# Patient Record
Sex: FEMALE | Race: WHITE | Employment: PART TIME | URBAN - METROPOLITAN AREA
[De-identification: names, ages, dates, MRNs, and addresses within clinical notes are randomized per-mention and may not be internally consistent; named-entity substitution may affect disease eponyms.]

---

## 2019-12-17 ENCOUNTER — HOSPITAL ENCOUNTER (OUTPATIENT)
Dept: PHYSICAL THERAPY | Age: 74
Discharge: HOME | End: 2019-12-17
Payer: MEDICARE

## 2019-12-17 ENCOUNTER — HOSPITAL ENCOUNTER (OUTPATIENT)
Dept: SURGERY | Age: 74
Discharge: HOME | End: 2019-12-17
Payer: MEDICARE

## 2019-12-17 ENCOUNTER — HOME HEALTH ADMISSION (OUTPATIENT)
Dept: HOME HEALTH SERVICES | Facility: HOME HEALTH | Age: 74
End: 2019-12-17
Payer: MEDICARE

## 2019-12-17 VITALS
SYSTOLIC BLOOD PRESSURE: 144 MMHG | BODY MASS INDEX: 24.55 KG/M2 | OXYGEN SATURATION: 98 % | HEART RATE: 70 BPM | HEIGHT: 61 IN | WEIGHT: 130 LBS | RESPIRATION RATE: 18 BRPM | TEMPERATURE: 97 F | DIASTOLIC BLOOD PRESSURE: 75 MMHG

## 2019-12-17 LAB
ANION GAP SERPL CALC-SCNC: 4 MMOL/L (ref 7–16)
ATRIAL RATE: 70 BPM
BACTERIA SPEC CULT: NORMAL
BUN SERPL-MCNC: 17 MG/DL (ref 8–23)
CALCIUM SERPL-MCNC: 9.3 MG/DL (ref 8.3–10.4)
CALCULATED P AXIS, ECG09: 93 DEGREES
CALCULATED R AXIS, ECG10: 16 DEGREES
CALCULATED T AXIS, ECG11: 5 DEGREES
CHLORIDE SERPL-SCNC: 107 MMOL/L (ref 98–107)
CO2 SERPL-SCNC: 29 MMOL/L (ref 21–32)
CREAT SERPL-MCNC: 0.95 MG/DL (ref 0.6–1)
DIAGNOSIS, 93000: NORMAL
ERYTHROCYTE [DISTWIDTH] IN BLOOD BY AUTOMATED COUNT: 13.2 % (ref 11.9–14.6)
GLUCOSE SERPL-MCNC: 137 MG/DL (ref 65–100)
HCT VFR BLD AUTO: 36.5 % (ref 35.8–46.3)
HGB BLD-MCNC: 10.8 G/DL (ref 11.7–15.4)
MCH RBC QN AUTO: 28.3 PG (ref 26.1–32.9)
MCHC RBC AUTO-ENTMCNC: 29.6 G/DL (ref 31.4–35)
MCV RBC AUTO: 95.5 FL (ref 79.6–97.8)
NRBC # BLD: 0 K/UL (ref 0–0.2)
P-R INTERVAL, ECG05: 196 MS
PLATELET # BLD AUTO: 252 K/UL (ref 150–450)
PMV BLD AUTO: 9 FL (ref 9.4–12.3)
POTASSIUM SERPL-SCNC: 3.8 MMOL/L (ref 3.5–5.1)
Q-T INTERVAL, ECG07: 384 MS
QRS DURATION, ECG06: 80 MS
QTC CALCULATION (BEZET), ECG08: 414 MS
RBC # BLD AUTO: 3.82 M/UL (ref 4.05–5.2)
SERVICE CMNT-IMP: NORMAL
SODIUM SERPL-SCNC: 140 MMOL/L (ref 136–145)
VENTRICULAR RATE, ECG03: 70 BPM
WBC # BLD AUTO: 8.3 K/UL (ref 4.3–11.1)

## 2019-12-17 PROCEDURE — 97161 PT EVAL LOW COMPLEX 20 MIN: CPT

## 2019-12-17 PROCEDURE — 36415 COLL VENOUS BLD VENIPUNCTURE: CPT

## 2019-12-17 PROCEDURE — 93005 ELECTROCARDIOGRAM TRACING: CPT | Performed by: ANESTHESIOLOGY

## 2019-12-17 PROCEDURE — 85027 COMPLETE CBC AUTOMATED: CPT

## 2019-12-17 PROCEDURE — 80048 BASIC METABOLIC PNL TOTAL CA: CPT

## 2019-12-17 PROCEDURE — 87641 MR-STAPH DNA AMP PROBE: CPT

## 2019-12-17 PROCEDURE — 77030027138 HC INCENT SPIROMETER -A

## 2019-12-17 RX ORDER — SIMVASTATIN 20 MG/1
20 TABLET, FILM COATED ORAL
COMMUNITY

## 2019-12-17 RX ORDER — IBUPROFEN 200 MG
TABLET ORAL
COMMUNITY
End: 2020-01-10

## 2019-12-17 RX ORDER — GLIPIZIDE 2.5 MG/1
2.5 TABLET, EXTENDED RELEASE ORAL
COMMUNITY

## 2019-12-17 RX ORDER — GABAPENTIN 300 MG/1
300 CAPSULE ORAL 2 TIMES DAILY
COMMUNITY

## 2019-12-17 RX ORDER — ZINC GLUCONATE 10 MG
LOZENGE ORAL
COMMUNITY

## 2019-12-17 RX ORDER — LORAZEPAM 1 MG/1
1 TABLET ORAL
COMMUNITY

## 2019-12-17 RX ORDER — ASPIRIN 81 MG/1
81 TABLET ORAL
COMMUNITY
End: 2020-01-10

## 2019-12-17 RX ORDER — HYDROGEN PEROXIDE 3 %
20 SOLUTION, NON-ORAL MISCELLANEOUS
COMMUNITY

## 2019-12-17 RX ORDER — CETIRIZINE HCL 10 MG
10 TABLET ORAL DAILY
COMMUNITY

## 2019-12-17 RX ORDER — MELOXICAM 7.5 MG/1
7.5 TABLET ORAL DAILY
COMMUNITY
End: 2020-01-10

## 2019-12-17 NOTE — PROGRESS NOTES
SW met with pt in Prehab to discuss Right TKA scheduled for 1/9/20. Pt reports she had a Left TKA about 6 years ago. Pt plans to return home with spouse and  HHPT. Pt resides in [facility] and chose [facility]. [facility] referral completed. Pt reports she has a RW but will need a BSC for home use. SW will meet with pt post op to verify DME needs. No additional need or questions identified at this time. Linh León    The Plan for Transition of Care is related to the following treatment goals: [place] at home. The Patien was provided with a choice of provider and agrees   with the discharge plan. [x] Yes [] No    Freedom of choice list was provided with basic dialogue that supports the patient's individualized plan of care/goals, treatment preferences and shares the quality data associated with the providers.  [x] Yes [] No

## 2019-12-17 NOTE — PERIOP NOTES
Recent Results (from the past 12 hour(s))   CBC W/O DIFF    Collection Time: 12/17/19  7:40 AM   Result Value Ref Range    WBC 8.3 4.3 - 11.1 K/uL    RBC 3.82 (L) 4.05 - 5.2 M/uL    HGB 10.8 (L) 11.7 - 15.4 g/dL    HCT 36.5 35.8 - 46.3 %    MCV 95.5 79.6 - 97.8 FL    MCH 28.3 26.1 - 32.9 PG    MCHC 29.6 (L) 31.4 - 35.0 g/dL    RDW 13.2 11.9 - 14.6 %    PLATELET 037 947 - 713 K/uL    MPV 9.0 (L) 9.4 - 12.3 FL    ABSOLUTE NRBC 0.00 0.0 - 0.2 K/uL   METABOLIC PANEL, BASIC    Collection Time: 12/17/19  7:40 AM   Result Value Ref Range    Sodium 140 136 - 145 mmol/L    Potassium 3.8 3.5 - 5.1 mmol/L    Chloride 107 98 - 107 mmol/L    CO2 29 21 - 32 mmol/L    Anion gap 4 (L) 7 - 16 mmol/L    Glucose 137 (H) 65 - 100 mg/dL    BUN 17 8 - 23 MG/DL    Creatinine 0.95 0.6 - 1.0 MG/DL    GFR est AA >60 >60 ml/min/1.73m2    GFR est non-AA >60 >60 ml/min/1.73m2    Calcium 9.3 8.3 - 10.4 MG/DL   EKG, 12 LEAD, INITIAL    Collection Time: 12/17/19  8:55 AM   Result Value Ref Range    Ventricular Rate 70 BPM    Atrial Rate 70 BPM    P-R Interval 196 ms    QRS Duration 80 ms    Q-T Interval 384 ms    QTC Calculation (Bezet) 414 ms    Calculated P Axis 93 degrees    Calculated R Axis 16 degrees    Calculated T Axis 5 degrees    Diagnosis       Atrial-paced rhythm  Low voltage QRS  Nonspecific ST abnormality  Abnormal ECG  No previous ECGs available

## 2019-12-17 NOTE — PERIOP NOTES
Patient verified name and . Order for consent NOT found in EHR, unable to confirm case posting; patient verified. Type 3 surgery, PAT joint assessment complete. Labs per surgeon: No orders received--lab work completed per anesthesia protocol. Labs per anesthesia protocol: cbc, bmp; results within anesthesia limits. T&S DOS and POC glucose DOS; orders signed and held in EHR. EKG: completed 19; results to be reviewed once previous EKG received for comparison. Charge nurse to f/u. Signed medical release faxed to Dr. Fidelia Wade office (Fax: [phone]) requested last EKG, Stress, Echo, and Cath report to be faxed to [phone]. Confirmation page received and placed on chart. Charge nurse will f/u. Copy of Pacemaker card, last cardiology office note (19), and Pacemaker Interrogation report (19) placed on chart if needed for anesthesia reference. Patient has an appointment with cardiologist on 19. Charge nurse will obtain/review note after appointment and place on chart for anesthesia reference. May Ashley ([phone]), [facility], made aware that patient is scheduled for a right TKA on 20 at [address] given pre-op's phone number to call for surgery start time. May Ashley verbalized understanding and states \"I'll put it on the calendar. \"     [facility] Surgery Scheduler, made aware that patient has a pacemaker. Bonita verbalized understanding. MRSA/MSSA swab collected; pharmacy to review and dose antibiotic as appropriate. Hospital approved surgical skin cleanser and instructions to return bottle on DOS given per hospital policy. Patient provided with handouts including Guide to Surgery, Pain Management, Hand Hygiene, Blood Transfusion Education, and [place] [facility] Brochure. Patient answered medical/surgical history questions at their best of ability. All prior to admission medications documented in [facility].  Original medication prescription bottle NOT visualized during patient appointment. Patient instructed to hold all vitamin, supplements, herbals 3 weeks prior to surgery and NSAIDS 5 days prior to surgery. Patient teach back successful and patient demonstrates knowledge of instruction.

## 2019-12-17 NOTE — PERIOP NOTES
Labs dated 12/17/19 routed via [facility] to patients PCP, Dr. Deandra Ferris, and to surgeon, Dr. Yvonne Roberts.

## 2019-12-17 NOTE — PROGRESS NOTES
12/17/19 0730   Oxygen Therapy   O2 Sat (%) 98 %   Pulse via Oximetry 73 beats per minute   O2 Device Room air   Pre-Treatment   Breath Sounds Bilateral Clear;Diminished   Pre FEV1 (liters) 1.5 liters   % Predicted 73   Incentive Spirometry Treatment   Actual Volume (ml) 1200 ml     Initial respiratory Assessment completed with pt. Pt was interviewed and evaluated in Joint camp prior to surgery. Patient ID:  Gilbert Wooten  [id number]  69 y.o.  1945  Surgeon: Dr. Mary Peoples  Date of Surgery: 1/9/2020  Procedure: Total Right Knee Arthroplasty  Primary Care Physician: Manisha Montoya [phone]  Specialists:                                  Pt instructed in the use of Incentive Spirometry. Pt instructed to bring Incentive Spirometer back on date of surgery & to start using Is upon return to pt room. Pt taught proper cough technique    History of smoking:   DENIES                                                      Quit date:           Secondhand smoke:SPOUSE      Past procedures with Oxygen desaturation:DELAYED AWAKENING AFTER KNEE SURGERY 6 YEARS AGO    Past Medical History:   Diagnosis Date    Adopted     Allergic rhinitis     Dyslipidemia     Essential hypertension     GERD (gastroesophageal reflux disease)     PRN medication     Nausea & vomiting     PAF (paroxysmal atrial fibrillation) ([address]     per cardiology note (6/14/19) \"There has been a history in the past however several EKGs and Holter monitors were reviewed with no recurrence. She has remained on aspirin. \"     SSS (sick sinus syndrome) ([address]     s/p PPM--Followed by Dr. Palak Connolly Type 2 diabetes mellitus ([address]     controlled with oral medication, average FBS . No problems with hypoglycemia.      VHD (valvular heart disease)     Mild to moderate TR/mild MR echo 2014                                                                                                                                                     Respiratory history:DENIES SOB                                                                   Respiratory meds:  DENIES                                       FAMILY PRESENT:            SPOUSE,                                                                                          PAST SLEEP STUDY:                      DENIES  HX OF SONY:                                      DENIES                                     SONY assessment:                                               SLEEPS ON SIDE       &      BACK                                                         PHYSICAL EXAM   Body mass index is 24.56 kg/m².    Visit Vitals  /75 (BP 1 Location: Left arm, BP Patient Position: At rest;Sitting)   Pulse 70   Temp 97 °F (36.1 °C)   Resp 18   Ht 5' 1\" (1.549 m)   Wt 59 kg (130 lb)   SpO2 98%   BMI 24.56 kg/m²     Neck circumference: 33     cm    Loud snoring:                                     DENIES    Witnessed apnea or wakening gasping or choking:,             DENIES,                                                                                                   Awakens with headaches:                                                  YES    Morning or daytime tiredness/ sleepiness:                                                                                                           TIRED   Dry mouth or sore throat in morning:                YES                                                                           Velez stage:  3    SACS score:2      Stop Bang   STOP-BANG  Does the patient snore loudly (louder than talking or loud enough to be heard through closed doors)?: No  Does the patient often feel tired, fatigued, or sleepy during the daytime, even after a \"good\" night's sleep?: Yes  Has anyone ever observed the patient stop breathing during their sleep? : No  Does the patient have or are they being treated for high blood pressure?: No  Is the patient's BMI greater than 35?: No  Is your neck circumference greater than 17 inches (Male) or 16 inches (Female)?: No  Is the patient older than 48?: Yes  Is the patient male?: No  SONY Score: 2                            CPAP:                       NONE                                                 CONT SAT HS            Referrals:    Pt. Phone Number:

## 2019-12-17 NOTE — PROGRESS NOTES
Octavio Primer  : (73 y.o.) [address] at [place] [address].  [phone]       Physical Therapy Prehab Plan of Treatment and Evaluation Summary:2019    ICD-10: Treatment Diagnosis:   · Pain in Right Knee (M25.561)  · Stiffness of Right Knee, Not elsewhere classified (M25.661)  · Difficulty in walking, Not elsewhere classified (R26.2)  Precautions/Allergies:   Clonidine; Codeine; Diclofenac; Hydrocodone; Lyrica [pregabalin]; Oxycodone; and Tramadol  MEDICAL/REFERRING DIAGNOSIS:  Unilateral primary osteoarthritis, right knee [M17.11]  REFERRING PHYSICIAN: Darek Paz,*  DATE OF SURGERY: 20    Assessment:   Comments:  Pt. Plans to go home with spouse who is here today. She reports a left tka 6 years ago at another hospital.  She had a hard time after that surgery due to a femur fracture during surgery. Her left knee is doing fine now but she is worried about this surgery after that experience. PROBLEM LIST (Impacting functional limitations):  Ms. Grecia Arredondo presents with the following right lower extremity(s) problems:  1. Strength  2. Range of Motion  3. Home Exercise Program  4. Pain   INTERVENTIONS PLANNED:  1. Home Exercise Program  2. Educational Discussion      TREATMENT PLAN: Effective Dates: 2019 TO 2019. Frequency/Duration: Patient to continue to perform home exercise program at least twice per day up until her surgery. GOALS: (Goals have been discussed and agreed upon with patient.)  Discharge Goals: Time Frame: 1 Day  1. Patient will demonstrate independence with a home exercise program designed to increase strength, range of motion and pain control to minimize functional deficits and optimize patient for total joint replacement.   Rehabilitation Potential For Stated Goals: Good  Regarding Sachin Kidd therapy, I certify that the treatment plan above will be carried out by a therapist or under their direction. Thank you for this referral,  Deepika Laguna, PT               HISTORY:   Present Symptoms:  Pain Intensity 1: (10 at worst)  Pain Location 1: Knee   History of Present Injury/Illness (Reason for Referral):  Medical/Referring Diagnosis: Unilateral primary osteoarthritis, right knee [M17.11]   Past Medical History/Comorbidities:   Ms. Kera Akers  has a past medical history of Adopted, Allergic rhinitis, Dyslipidemia, Essential hypertension, GERD (gastroesophageal reflux disease), Nausea & vomiting, PAF (paroxysmal atrial fibrillation) ([facility] SSS (sick sinus syndrome) ([facility] Type 2 diabetes mellitus ([facility] and VHD (valvular heart disease).   Ms. Kera Akers  has a past surgical history that includes hx knee replacement (Left, 2014); hx tubal ligation; hx pacemaker placement (10/26/2018); hx dilation and curettage; hx colonoscopy; hx heart catheterization; and hx other surgical.  Social History/Living Environment:   Home Environment: Private residence  # Steps to Enter: 2  Rails to Enter: No  One/Two Story Residence: One story  Living Alone: No  Support Systems: Spouse/Significant Other/Partner  Patient Expects to be Discharged to[de-identified] Private residence  Current DME Used/Available at Home: Ferdinand Welsh, straight, Walker, rolling, Commode, bedside, Shower chair  Tub or Shower Type: Shower  Work/Activity:  Part time , mostly sitting  Dominant Side:  RIGHT  Current Medications:  See Pre-assessment nursing note   Number of Personal Factors/Comorbidities that affect the Plan of Care: 1-2: MODERATE COMPLEXITY   EXAMINATION:   ADLs (Current Functional Status):   Ambulation:  [x] Independent  [] Walk Indoors Only  [] Walk Outdoors  [] Use Assistive Device  [] Use Wheelchair Only Dressing:  [x] Independent  Requires Assistance from Someone for:  [] Sock/Shoes  [] Pants  [] Everything   Bathing/Showering:   [x] Independent  [] Requires Assistance from Someone  [] [address] Only Household Activities:  [x] Routine house and yard work  [x] Light Housework Only  [] None   Observation/Orthostatic Postural Assessment:   Exceptions to WDLRounded shoulders  ROM/Flexibility:   Gross Assessment: Yes  AROM: Within functional limits(left LE)                       RLE Assessment  RLE Assessment (WDL): Exceptions to WDL  RLE AROM  R Knee Flexion: 117  R Knee Extension: 5   Strength:   Gross Assessment: Yes  Strength: Generally decreased, functional(left LE)              RLE Strength  R Knee Flexion: 4  R Knee Extension: 4   Functional Mobility:    Gross Assessment: Yes    Gait Description (WDL): Exceptions to WDL  Stand to Sit: Additional time, Independent  Sit to Stand: Independent, Additional time  Distance (ft): 500 Feet (ft)  Ambulation - Level of Assistance: Independent  Speed/Maria [place] Carmen: Delayed  Step Length: Left shortened;Right shortened  Stance: Left decreased  Gait Abnormalities: Antalgic          Balance:    Sitting: Intact  Standing: Intact   Body Structures Involved:  1. Bones  2. Joints  3. Muscles  4. Ligaments Body Functions Affected:  1. Movement Related Activities and Participation Affected:  1. Mobility   Number of elements that affect the Plan of Care: 3: MODERATE COMPLEXITY   CLINICAL PRESENTATION:   Presentation: Stable and uncomplicated: LOW COMPLEXITY   CLINICAL DECISION MAKING:   Outcome Measure: Tool Used: Lower Extremity Functional Scale (LEFS)  Score:  Initial: 38/80 Most Recent: X/80 (Date: -- )   Interpretation of Score: 20 questions each scored on a 5 point scale with 0 representing \"extreme difficulty or unable to perform\" and 4 representing \"no difficulty\". The lower the score, the greater the functional disability. 80/80 represents no disability. Minimal detectable change is 9 points. Medical Necessity:   · Ms. Grecia Arredondo is expected to optimize her lower extremity strength and ROM in preparation for joint replacement surgery.   Reason for Services/Other Comments:  · Achieve baseline assesment of musculoskeletal system, functional mobility and home environment. , educate in PT HEP in preparation for surgery, educate in hospital plan of care. Use of outcome tool(s) and clinical judgement create a POC that gives a: Clear prediction of patient's progress: LOW COMPLEXITY   TREATMENT:   Treatment/Session Assessment:  Patient was instructed in PT- HEP to increase strength and ROM in LEs. Answered all questions. · Post session pain:  Knee pain  · Compliance with Program/Exercises: compliant most of the time.   Total Treatment Duration:  PT Patient Time In/Time Out  Time In: 0745  Time Out: 0800    Annie Wei PT

## 2019-12-17 NOTE — PERIOP NOTES
Pt called to report she forgot to inform RN earlier today she uses oxicell cream on her legs for pain relief. Added oxicell cream to medication list in EMR, also instructed to pt stop using day prior to surgery.

## 2019-12-17 NOTE — PERIOP NOTES
PLEASE CONTINUE TAKING ALL PRESCRIPTION MEDICATIONS UP TO THE DAY OF SURGERY UNLESS OTHERWISE DIRECTED BELOW. DISCONTINUE all vitamins, herbals and supplements 21 days prior to surgery. DISCONTINUE Non-Steriodal Anti-Inflammatory (NSAIDS) such as Advil, Ibuprofen, and Aleve 5 days prior to surgery. Home Medications to HOLD      All vitamins, supplements, and herbals stop 21 days prior to surgery--including Berberine and CBD oil. All NSAIDs such as MELOXICAM, Advil, Aleve, Ibuprofen, Diclofenac, Naproxen, etc. Stop 5 days prior to surgery. Home Medications to take  the day of surgery   Gabapentin, Nexium        Comments   Please bring bottle of soap (Dynahex), incentive spirometer, Cetirizine, and Nexium to the hospital.       *Please continue nightly 81 mg Aspirin*       Please do not bring home medications with you on the day of surgery unless otherwise directed by your nurse. If you are instructed to bring home medications, please give them to your nurse as they will be administered by the nursing staff. If you have any questions, please call [address] [place] [address] [phone] or [address] [place] [address] [phone]. Copy of above instructions given to patient.

## 2019-12-20 NOTE — H&P
[facility]  History and Physical Exam    Patient ID:  Rambo Franco  [id number]    69 y.o.  1945    Today: December 20, 2019    Vitals Signs: Reviewed as noted in medical record. Allergies: Allergies   Allergen Reactions    Clonidine Itching    Codeine Itching     And all derivitives      Diclofenac Other (comments) and Nausea and Vomiting    Hydrocodone Itching    Lyrica [Pregabalin] Other (comments)     \"makes me feel funny\"     Oxycodone Itching    Tramadol Itching       CC: Right knee pain    HPI:  Pt complains of right knee pain with difficulty ambulating. Relevant PMH:   Past Medical History:   Diagnosis Date    Adopted     Allergic rhinitis     Dyslipidemia     Essential hypertension     GERD (gastroesophageal reflux disease)     PRN medication     Nausea & vomiting     PAF (paroxysmal atrial fibrillation) ([address]     per cardiology note (6/14/19) \"There has been a history in the past however several EKGs and Holter monitors were reviewed with no recurrence. She has remained on aspirin. \"     SSS (sick sinus syndrome) ([facility]     s/p PPM--Followed by Dr. Rula Quispe Type 2 diabetes mellitus ([facility]     controlled with oral medication, average FBS . No problems with hypoglycemia.  VHD (valvular heart disease)     Mild to moderate TR/mild MR echo 2014       Objective:                    HEENT: NC/AT                   Lungs:  clear                   Heart:   rrr                   Abdomen: soft                   Extremities:  Pain with rom of the right knee joint    Radiographs: reveal osteoarthritis with loss of joint space and bone spurs. Assessment: Unilateral primary osteoarthritis, right knee [M17.11]    Plan:  Proceed with scheduled Procedure(s) (LRB):  RIGHT KNEE ARTHROPLASTY TOTAL / GREGG PT HAS A PACEMAKER (Right) . The patient has failed conservative treatment including NSAIDS, and injections.   Due to the amount of pain the patient is experiencing we will proceed with scheduled procedure. It is also felt that the patient is high risk for postoperative complications due to her advanced age and history of multiple chronic medical problems.   Patient may potentially spend 2 nights in the hospital.       Signed By: IGNACIO Portillo  December 20, 2019

## 2019-12-31 NOTE — ADVANCED PRACTICE NURSE
Total Joint Surgery Preoperative Chart Review      Patient ID:  Alexis Culp  [id number]  69 y.o.  1945  Surgeon: Dr. Navin Castellanos  Date of Surgery: 1/9/2020  Procedure: Total Right Knee Arthroplasty  Primary Care Physician: Stacy Ibarra [phone]  Specialty Physician(s):      Subjective:   Alexis Cupl is a 69 y.o. WHITE OR  female who presents for preoperative evaluation for Total Right Knee arthroplasty. This is a preoperative chart review note based on data collected by the nurse at the surgical Pre-Assessment visit. Past Medical History:   Diagnosis Date    Adopted     Allergic rhinitis     Dyslipidemia     Essential hypertension     GERD (gastroesophageal reflux disease)     PRN medication     Nausea & vomiting     PAF (paroxysmal atrial fibrillation) ([facility]     per cardiology note (6/14/19) \"There has been a history in the past however several EKGs and Holter monitors were reviewed with no recurrence. She has remained on aspirin. \"     SSS (sick sinus syndrome) ([facility]     s/p PPM--Followed by Dr. Augusto Baptiste Type 2 diabetes mellitus ([facility]     controlled with oral medication, average FBS . No problems with hypoglycemia.  VHD (valvular heart disease)     Mild to moderate TR/mild MR echo 2014      Past Surgical History:   Procedure Laterality Date    HX COLONOSCOPY      HX DILATION AND CURETTAGE      HX HEART CATHETERIZATION      x2--no intervention     HX KNEE REPLACEMENT Left 2014    HX OTHER SURGICAL      esophageal stretching     HX PACEMAKER PLACEMENT  10/26/2018    HX TUBAL LIGATION       No family history on file. Social History     Tobacco Use    Smoking status: Never Smoker    Smokeless tobacco: Never Used   Substance Use Topics    Alcohol use: Never     Frequency: Never       Prior to Admission medications    Medication Sig Start Date End Date Taking? Authorizing Provider   simvastatin (ZOCOR) 20 mg tablet Take  by mouth nightly.    Yes Provider, Historical   gabapentin (NEURONTIN) 300 mg capsule Take 300 mg by mouth two (2) times a day. Yes Provider, Historical   meloxicam (MOBIC) 7.5 mg tablet Take 7.5 mg by mouth daily. Yes Provider, Historical   glipiZIDE SR (GLUCOTROL XL) 2.5 mg CR tablet Take  by mouth nightly. Yes Provider, Historical   LORazepam (ATIVAN) 1 mg tablet Take  by mouth nightly. Yes Provider, Historical   aspirin delayed-release 81 mg tablet Take 81 mg by mouth nightly. Yes Provider, Historical   cetirizine (ZYRTEC) 10 mg tablet Take 10 mg by mouth daily. Yes Provider, Historical   esomeprazole (NEXIUM) 20 mg capsule Take 20 mg by mouth daily as needed for (GERD) Gastroesophageal Reflux Disease. Yes Provider, Historical   Berb Hi/herbal complex no.18 (BERBERINE-HERBAL COMB NO.18 PO) Take 1,000 mg by mouth two (2) times a day. Yes Provider, Historical   ibuprofen (MOTRIN) 200 mg tablet Take  by mouth every six (6) hours as needed for Pain. Yes Provider, Historical   CANNABIDIOL, CBD, EXTRACT PO Take  by mouth. Yes Provider, Historical   cholecalciferol, vitamin D3, (VITAMIN D3 PO) Take  by mouth. Yes Provider, Historical   magnesium 250 mg tab Take  by mouth. Yes Provider, Historical   CALCIUM PO Take  by mouth. Yes Provider, Historical   OTHER Apply  to affected area. Oxicell cream for pain   Yes Provider, Historical     Allergies   Allergen Reactions    Clonidine Itching    Codeine Itching     And all derivitives      Diclofenac Other (comments) and Nausea and Vomiting    Hydrocodone Itching    Lyrica [Pregabalin] Other (comments)     \"makes me feel funny\"     Oxycodone Itching    Tramadol Itching          Objective:     Physical Exam:   No data found.     ECG:    EKG Results     Procedure 720 Value Units Date/Time    EKG, 12 LEAD, INITIAL [[id number]] Collected:  12/17/19 0855    Order Status:  Completed Updated:  12/17/19 1143     Ventricular Rate 70 BPM      Atrial Rate 70 BPM      P-R Interval 196 ms QRS Duration 80 ms      Q-T Interval 384 ms      QTC Calculation (Bezet) 414 ms      Calculated P Axis 93 degrees      Calculated R Axis 16 degrees      Calculated T Axis 5 degrees      Diagnosis --     Atrial-paced rhythm  Low voltage QRS  Nonspecific ST abnormality  Abnormal ECG  No previous ECGs available  Confirmed by ST JO ANN FAYE MD (), GIO BIRCH ([id number]) on 12/17/2019 11:43:12 AM            Data Review:   Labs:     Results for Calista Iqbal (MRN [id number]) as of 12/31/2019 12:31   Ref. Range 12/17/2019 07:40   WBC Latest Ref Range: 4.3 - 11.1 K/uL 8.3   RBC Latest Ref Range: 4.05 - 5.2 M/uL 3.82 (L)   HGB Latest Ref Range: 11.7 - 15.4 g/dL 10.8 (L)   HCT Latest Ref Range: 35.8 - 46.3 % 36.5   MCV Latest Ref Range: 79.6 - 97.8 FL 95.5   MCH Latest Ref Range: 26.1 - 32.9 PG 28.3   MCHC Latest Ref Range: 31.4 - 35.0 g/dL 29.6 (L)   RDW Latest Ref Range: 11.9 - 14.6 % 13.2   PLATELET Latest Ref Range: 150 - 450 K/uL 252   MPV Latest Ref Range: 9.4 - 12.3 FL 9.0 (L)   ABSOLUTE NRBC Latest Ref Range: 0.0 - 0.2 K/uL 0.00       Total Joint Surgery Pre-Assessment Recommendations:           Recommend continuous saturation monitoring hours of sleep, during hospitalization.       Signed By: LINA Harvey    December 31, 2019

## 2020-01-02 NOTE — PERIOP NOTES
3rd and final request faxed to [facility] requesting most recent EKG and any other test reports be faxed for anesthesiologist to review pre op. Received faxed EKG dated 10/24/18. No other reports available per note on fax cover sheet. Also faxed request to [facility] in [place], pt not on file at their office. Chart posted for anesthesia to review EKGs/chart.

## 2020-01-07 ENCOUNTER — ANESTHESIA EVENT (OUTPATIENT)
Dept: SURGERY | Age: 75
DRG: 470 | End: 2020-01-07
Payer: MEDICARE

## 2020-01-09 ENCOUNTER — HOSPITAL ENCOUNTER (INPATIENT)
Age: 75
LOS: 1 days | Discharge: HOME HEALTH | DRG: 470 | End: 2020-01-10
Attending: ORTHOPAEDIC SURGERY | Admitting: ORTHOPAEDIC SURGERY
Payer: MEDICARE

## 2020-01-09 ENCOUNTER — ANESTHESIA (OUTPATIENT)
Dept: SURGERY | Age: 75
DRG: 470 | End: 2020-01-09
Payer: MEDICARE

## 2020-01-09 DIAGNOSIS — Z96.651 STATUS POST TOTAL KNEE REPLACEMENT, RIGHT: Primary | ICD-10-CM

## 2020-01-09 PROBLEM — M17.11 ARTHRITIS OF RIGHT KNEE: Status: ACTIVE | Noted: 2020-01-09

## 2020-01-09 LAB
GLUCOSE BLD STRIP.AUTO-MCNC: 129 MG/DL (ref 65–100)
HGB BLD-MCNC: 8.9 G/DL (ref 11.7–15.4)

## 2020-01-09 PROCEDURE — 74011250637 HC RX REV CODE- 250/637: Performed by: PHYSICIAN ASSISTANT

## 2020-01-09 PROCEDURE — 74011000250 HC RX REV CODE- 250: Performed by: ORTHOPAEDIC SURGERY

## 2020-01-09 PROCEDURE — 74011250636 HC RX REV CODE- 250/636: Performed by: NURSE ANESTHETIST, CERTIFIED REGISTERED

## 2020-01-09 PROCEDURE — 77030040922 HC BLNKT HYPOTHRM STRY -A: Performed by: ANESTHESIOLOGY

## 2020-01-09 PROCEDURE — C1713 ANCHOR/SCREW BN/BN,TIS/BN: HCPCS | Performed by: ORTHOPAEDIC SURGERY

## 2020-01-09 PROCEDURE — 77030019557 HC ELECTRD VES SEAL MEDT -F: Performed by: ORTHOPAEDIC SURGERY

## 2020-01-09 PROCEDURE — 74011250636 HC RX REV CODE- 250/636: Performed by: ORTHOPAEDIC SURGERY

## 2020-01-09 PROCEDURE — 74011000250 HC RX REV CODE- 250: Performed by: ANESTHESIOLOGY

## 2020-01-09 PROCEDURE — 94762 N-INVAS EAR/PLS OXIMTRY CONT: CPT

## 2020-01-09 PROCEDURE — 97161 PT EVAL LOW COMPLEX 20 MIN: CPT

## 2020-01-09 PROCEDURE — 77030018836 HC SOL IRR NACL ICUM -A: Performed by: ORTHOPAEDIC SURGERY

## 2020-01-09 PROCEDURE — 97165 OT EVAL LOW COMPLEX 30 MIN: CPT

## 2020-01-09 PROCEDURE — 82962 GLUCOSE BLOOD TEST: CPT

## 2020-01-09 PROCEDURE — 76010000171 HC OR TIME 2 TO 2.5 HR INTENSV-TIER 1: Performed by: ORTHOPAEDIC SURGERY

## 2020-01-09 PROCEDURE — 77030029828 HC FEM TIB CKPNT KT DISP STRY -B: Performed by: ORTHOPAEDIC SURGERY

## 2020-01-09 PROCEDURE — 8E0Y0CZ ROBOTIC ASSISTED PROCEDURE OF LOWER EXTREMITY, OPEN APPROACH: ICD-10-PCS | Performed by: ORTHOPAEDIC SURGERY

## 2020-01-09 PROCEDURE — 77030031139 HC SUT VCRL2 J&J -A: Performed by: ORTHOPAEDIC SURGERY

## 2020-01-09 PROCEDURE — 74011250636 HC RX REV CODE- 250/636: Performed by: ANESTHESIOLOGY

## 2020-01-09 PROCEDURE — 74011000258 HC RX REV CODE- 258: Performed by: ORTHOPAEDIC SURGERY

## 2020-01-09 PROCEDURE — 77030020256 HC SOL INJ NACL 0.9%  500ML: Performed by: ORTHOPAEDIC SURGERY

## 2020-01-09 PROCEDURE — 77010033678 HC OXYGEN DAILY

## 2020-01-09 PROCEDURE — C1776 JOINT DEVICE (IMPLANTABLE): HCPCS | Performed by: ORTHOPAEDIC SURGERY

## 2020-01-09 PROCEDURE — 65270000029 HC RM PRIVATE

## 2020-01-09 PROCEDURE — 76060000036 HC ANESTHESIA 2.5 TO 3 HR: Performed by: ORTHOPAEDIC SURGERY

## 2020-01-09 PROCEDURE — 77030003665 HC NDL SPN BBMI -A: Performed by: ANESTHESIOLOGY

## 2020-01-09 PROCEDURE — 94760 N-INVAS EAR/PLS OXIMETRY 1: CPT

## 2020-01-09 PROCEDURE — 74011250636 HC RX REV CODE- 250/636: Performed by: PHYSICIAN ASSISTANT

## 2020-01-09 PROCEDURE — 77030003602 HC NDL NRV BLK BBMI -B: Performed by: ANESTHESIOLOGY

## 2020-01-09 PROCEDURE — 77030011208: Performed by: ORTHOPAEDIC SURGERY

## 2020-01-09 PROCEDURE — 97110 THERAPEUTIC EXERCISES: CPT

## 2020-01-09 PROCEDURE — 77030038149 HC BLD SAW SAG STRY -D: Performed by: ORTHOPAEDIC SURGERY

## 2020-01-09 PROCEDURE — 77030012935 HC DRSG AQUACEL BMS -B: Performed by: ORTHOPAEDIC SURGERY

## 2020-01-09 PROCEDURE — 76210000006 HC OR PH I REC 0.5 TO 1 HR: Performed by: ORTHOPAEDIC SURGERY

## 2020-01-09 PROCEDURE — 36415 COLL VENOUS BLD VENIPUNCTURE: CPT

## 2020-01-09 PROCEDURE — 76010010054 HC POST OP PAIN BLOCK: Performed by: ORTHOPAEDIC SURGERY

## 2020-01-09 PROCEDURE — 85018 HEMOGLOBIN: CPT

## 2020-01-09 PROCEDURE — 77030007880 HC KT SPN EPDRL BBMI -B: Performed by: ANESTHESIOLOGY

## 2020-01-09 PROCEDURE — 77030013708 HC HNDPC SUC IRR PULS STRY –B: Performed by: ORTHOPAEDIC SURGERY

## 2020-01-09 PROCEDURE — 77030008462 HC STPLR SKN PROX J&J -A: Performed by: ORTHOPAEDIC SURGERY

## 2020-01-09 PROCEDURE — 0SRC0J9 REPLACEMENT OF RIGHT KNEE JOINT WITH SYNTHETIC SUBSTITUTE, CEMENTED, OPEN APPROACH: ICD-10-PCS | Performed by: ORTHOPAEDIC SURGERY

## 2020-01-09 PROCEDURE — 77030029820: Performed by: ORTHOPAEDIC SURGERY

## 2020-01-09 PROCEDURE — 97535 SELF CARE MNGMENT TRAINING: CPT

## 2020-01-09 PROCEDURE — 77030002966 HC SUT PDS J&J -A: Performed by: ORTHOPAEDIC SURGERY

## 2020-01-09 PROCEDURE — 76942 ECHO GUIDE FOR BIOPSY: CPT | Performed by: ORTHOPAEDIC SURGERY

## 2020-01-09 PROCEDURE — 74011000250 HC RX REV CODE- 250: Performed by: NURSE ANESTHETIST, CERTIFIED REGISTERED

## 2020-01-09 PROCEDURE — 77030002933 HC SUT MCRYL J&J -A: Performed by: ORTHOPAEDIC SURGERY

## 2020-01-09 DEVICE — COMPNT FEM PS CEM TRIATHLN 4 R --: Type: IMPLANTABLE DEVICE | Site: KNEE | Status: FUNCTIONAL

## 2020-01-09 DEVICE — CEMENT BNE SIMPLEX W/GENT -- 10/PK: Type: IMPLANTABLE DEVICE | Site: KNEE | Status: FUNCTIONAL

## 2020-01-09 DEVICE — COMPONENT PAT DIA29MM THK8MM KNEE SYMMETRIC NP PRI CEM W/O: Type: IMPLANTABLE DEVICE | Site: KNEE | Status: FUNCTIONAL

## 2020-01-09 DEVICE — INSERT TIB SZ 3 11MM KNEE POLY POST STBL CONVENTIONAL: Type: IMPLANTABLE DEVICE | Site: KNEE | Status: FUNCTIONAL

## 2020-01-09 DEVICE — BASEPLT TIB UNIV TRIATHLN 3 --: Type: IMPLANTABLE DEVICE | Site: KNEE | Status: FUNCTIONAL

## 2020-01-09 RX ORDER — HYDROMORPHONE HYDROCHLORIDE 2 MG/ML
0.5 INJECTION, SOLUTION INTRAMUSCULAR; INTRAVENOUS; SUBCUTANEOUS
Status: DISCONTINUED | OUTPATIENT
Start: 2020-01-09 | End: 2020-01-09 | Stop reason: HOSPADM

## 2020-01-09 RX ORDER — SODIUM CHLORIDE, SODIUM LACTATE, POTASSIUM CHLORIDE, CALCIUM CHLORIDE 600; 310; 30; 20 MG/100ML; MG/100ML; MG/100ML; MG/100ML
75 INJECTION, SOLUTION INTRAVENOUS CONTINUOUS
Status: DISCONTINUED | OUTPATIENT
Start: 2020-01-09 | End: 2020-01-09 | Stop reason: HOSPADM

## 2020-01-09 RX ORDER — DIPHENHYDRAMINE HCL 25 MG
25 CAPSULE ORAL
Status: DISCONTINUED | OUTPATIENT
Start: 2020-01-09 | End: 2020-01-10 | Stop reason: HOSPADM

## 2020-01-09 RX ORDER — SODIUM CHLORIDE 9 MG/ML
100 INJECTION, SOLUTION INTRAVENOUS CONTINUOUS
Status: DISCONTINUED | OUTPATIENT
Start: 2020-01-09 | End: 2020-01-10 | Stop reason: HOSPADM

## 2020-01-09 RX ORDER — HYDROCODONE BITARTRATE AND ACETAMINOPHEN 10; 325 MG/1; MG/1
1 TABLET ORAL
Status: DISCONTINUED | OUTPATIENT
Start: 2020-01-09 | End: 2020-01-10 | Stop reason: HOSPADM

## 2020-01-09 RX ORDER — ACETAMINOPHEN 10 MG/ML
1000 INJECTION, SOLUTION INTRAVENOUS ONCE
Status: COMPLETED | OUTPATIENT
Start: 2020-01-09 | End: 2020-01-09

## 2020-01-09 RX ORDER — LORAZEPAM 1 MG/1
1 TABLET ORAL
Status: DISCONTINUED | OUTPATIENT
Start: 2020-01-09 | End: 2020-01-10 | Stop reason: HOSPADM

## 2020-01-09 RX ORDER — ACETAMINOPHEN 500 MG
1000 TABLET ORAL EVERY 6 HOURS
Status: DISCONTINUED | OUTPATIENT
Start: 2020-01-10 | End: 2020-01-10

## 2020-01-09 RX ORDER — NEOMYCIN AND POLYMYXIN B SULFATES 40; 200000 MG/ML; [USP'U]/ML
SOLUTION IRRIGATION AS NEEDED
Status: DISCONTINUED | OUTPATIENT
Start: 2020-01-09 | End: 2020-01-09 | Stop reason: HOSPADM

## 2020-01-09 RX ORDER — DEXAMETHASONE SODIUM PHOSPHATE 4 MG/ML
INJECTION, SOLUTION INTRA-ARTICULAR; INTRALESIONAL; INTRAMUSCULAR; INTRAVENOUS; SOFT TISSUE
Status: COMPLETED | OUTPATIENT
Start: 2020-01-09 | End: 2020-01-09

## 2020-01-09 RX ORDER — DEXAMETHASONE SODIUM PHOSPHATE 100 MG/10ML
10 INJECTION INTRAMUSCULAR; INTRAVENOUS ONCE
Status: DISCONTINUED | OUTPATIENT
Start: 2020-01-10 | End: 2020-01-10 | Stop reason: HOSPADM

## 2020-01-09 RX ORDER — PROPOFOL 10 MG/ML
INJECTION, EMULSION INTRAVENOUS
Status: DISCONTINUED | OUTPATIENT
Start: 2020-01-09 | End: 2020-01-09 | Stop reason: HOSPADM

## 2020-01-09 RX ORDER — ASPIRIN 81 MG/1
81 TABLET ORAL EVERY 12 HOURS
Status: DISCONTINUED | OUTPATIENT
Start: 2020-01-09 | End: 2020-01-10 | Stop reason: HOSPADM

## 2020-01-09 RX ORDER — ONDANSETRON 2 MG/ML
INJECTION INTRAMUSCULAR; INTRAVENOUS AS NEEDED
Status: DISCONTINUED | OUTPATIENT
Start: 2020-01-09 | End: 2020-01-09 | Stop reason: HOSPADM

## 2020-01-09 RX ORDER — MIDAZOLAM HYDROCHLORIDE 1 MG/ML
2 INJECTION, SOLUTION INTRAMUSCULAR; INTRAVENOUS
Status: DISCONTINUED | OUTPATIENT
Start: 2020-01-09 | End: 2020-01-09 | Stop reason: HOSPADM

## 2020-01-09 RX ORDER — PROPOFOL 10 MG/ML
INJECTION, EMULSION INTRAVENOUS AS NEEDED
Status: DISCONTINUED | OUTPATIENT
Start: 2020-01-09 | End: 2020-01-09 | Stop reason: HOSPADM

## 2020-01-09 RX ORDER — ALBUTEROL SULFATE 0.83 MG/ML
2.5 SOLUTION RESPIRATORY (INHALATION) AS NEEDED
Status: DISCONTINUED | OUTPATIENT
Start: 2020-01-09 | End: 2020-01-09 | Stop reason: HOSPADM

## 2020-01-09 RX ORDER — HYDROMORPHONE HYDROCHLORIDE 1 MG/ML
1 INJECTION, SOLUTION INTRAMUSCULAR; INTRAVENOUS; SUBCUTANEOUS
Status: DISCONTINUED | OUTPATIENT
Start: 2020-01-09 | End: 2020-01-10 | Stop reason: HOSPADM

## 2020-01-09 RX ORDER — DIPHENHYDRAMINE HYDROCHLORIDE 50 MG/ML
INJECTION, SOLUTION INTRAMUSCULAR; INTRAVENOUS AS NEEDED
Status: DISCONTINUED | OUTPATIENT
Start: 2020-01-09 | End: 2020-01-09 | Stop reason: HOSPADM

## 2020-01-09 RX ORDER — FENTANYL CITRATE 50 UG/ML
100 INJECTION, SOLUTION INTRAMUSCULAR; INTRAVENOUS ONCE
Status: COMPLETED | OUTPATIENT
Start: 2020-01-09 | End: 2020-01-09

## 2020-01-09 RX ORDER — VANCOMYCIN HYDROCHLORIDE 1 G/20ML
INJECTION, POWDER, LYOPHILIZED, FOR SOLUTION INTRAVENOUS AS NEEDED
Status: DISCONTINUED | OUTPATIENT
Start: 2020-01-09 | End: 2020-01-09 | Stop reason: HOSPADM

## 2020-01-09 RX ORDER — DEXAMETHASONE SODIUM PHOSPHATE 4 MG/ML
INJECTION, SOLUTION INTRA-ARTICULAR; INTRALESIONAL; INTRAMUSCULAR; INTRAVENOUS; SOFT TISSUE AS NEEDED
Status: DISCONTINUED | OUTPATIENT
Start: 2020-01-09 | End: 2020-01-09 | Stop reason: HOSPADM

## 2020-01-09 RX ORDER — ONDANSETRON 4 MG/1
4 TABLET, ORALLY DISINTEGRATING ORAL
Status: DISCONTINUED | OUTPATIENT
Start: 2020-01-09 | End: 2020-01-10 | Stop reason: HOSPADM

## 2020-01-09 RX ORDER — SODIUM CHLORIDE, SODIUM LACTATE, POTASSIUM CHLORIDE, CALCIUM CHLORIDE 600; 310; 30; 20 MG/100ML; MG/100ML; MG/100ML; MG/100ML
50 INJECTION, SOLUTION INTRAVENOUS CONTINUOUS
Status: DISCONTINUED | OUTPATIENT
Start: 2020-01-09 | End: 2020-01-09 | Stop reason: HOSPADM

## 2020-01-09 RX ORDER — ROPIVACAINE HYDROCHLORIDE 2 MG/ML
INJECTION, SOLUTION EPIDURAL; INFILTRATION; PERINEURAL AS NEEDED
Status: DISCONTINUED | OUTPATIENT
Start: 2020-01-09 | End: 2020-01-09 | Stop reason: HOSPADM

## 2020-01-09 RX ORDER — GABAPENTIN 300 MG/1
300 CAPSULE ORAL 2 TIMES DAILY
Status: DISCONTINUED | OUTPATIENT
Start: 2020-01-09 | End: 2020-01-10 | Stop reason: HOSPADM

## 2020-01-09 RX ORDER — CEFAZOLIN SODIUM/WATER 2 G/20 ML
2 SYRINGE (ML) INTRAVENOUS ONCE
Status: COMPLETED | OUTPATIENT
Start: 2020-01-09 | End: 2020-01-09

## 2020-01-09 RX ORDER — TRANEXAMIC ACID 100 MG/ML
INJECTION, SOLUTION INTRAVENOUS AS NEEDED
Status: DISCONTINUED | OUTPATIENT
Start: 2020-01-09 | End: 2020-01-09 | Stop reason: HOSPADM

## 2020-01-09 RX ORDER — SODIUM CHLORIDE 0.9 % (FLUSH) 0.9 %
5-40 SYRINGE (ML) INJECTION EVERY 8 HOURS
Status: DISCONTINUED | OUTPATIENT
Start: 2020-01-09 | End: 2020-01-10 | Stop reason: HOSPADM

## 2020-01-09 RX ORDER — GLIPIZIDE 2.5 MG/1
2.5 TABLET, EXTENDED RELEASE ORAL
Status: DISCONTINUED | OUTPATIENT
Start: 2020-01-09 | End: 2020-01-10 | Stop reason: HOSPADM

## 2020-01-09 RX ORDER — LIDOCAINE HYDROCHLORIDE 10 MG/ML
0.1 INJECTION INFILTRATION; PERINEURAL AS NEEDED
Status: DISCONTINUED | OUTPATIENT
Start: 2020-01-09 | End: 2020-01-09 | Stop reason: HOSPADM

## 2020-01-09 RX ORDER — BUPIVACAINE HYDROCHLORIDE 7.5 MG/ML
INJECTION, SOLUTION INTRASPINAL
Status: COMPLETED | OUTPATIENT
Start: 2020-01-09 | End: 2020-01-09

## 2020-01-09 RX ORDER — PANTOPRAZOLE SODIUM 40 MG/1
40 TABLET, DELAYED RELEASE ORAL
Status: DISCONTINUED | OUTPATIENT
Start: 2020-01-10 | End: 2020-01-10 | Stop reason: HOSPADM

## 2020-01-09 RX ORDER — KETOROLAC TROMETHAMINE 30 MG/ML
INJECTION, SOLUTION INTRAMUSCULAR; INTRAVENOUS AS NEEDED
Status: DISCONTINUED | OUTPATIENT
Start: 2020-01-09 | End: 2020-01-09 | Stop reason: HOSPADM

## 2020-01-09 RX ORDER — AMOXICILLIN 250 MG
2 CAPSULE ORAL DAILY
Status: DISCONTINUED | OUTPATIENT
Start: 2020-01-10 | End: 2020-01-10 | Stop reason: HOSPADM

## 2020-01-09 RX ORDER — NALOXONE HYDROCHLORIDE 0.4 MG/ML
.2-.4 INJECTION, SOLUTION INTRAMUSCULAR; INTRAVENOUS; SUBCUTANEOUS
Status: DISCONTINUED | OUTPATIENT
Start: 2020-01-09 | End: 2020-01-10 | Stop reason: HOSPADM

## 2020-01-09 RX ORDER — SODIUM CHLORIDE 0.9 % (FLUSH) 0.9 %
5-40 SYRINGE (ML) INJECTION AS NEEDED
Status: DISCONTINUED | OUTPATIENT
Start: 2020-01-09 | End: 2020-01-10 | Stop reason: HOSPADM

## 2020-01-09 RX ORDER — MIDAZOLAM HYDROCHLORIDE 1 MG/ML
2 INJECTION, SOLUTION INTRAMUSCULAR; INTRAVENOUS ONCE
Status: COMPLETED | OUTPATIENT
Start: 2020-01-09 | End: 2020-01-09

## 2020-01-09 RX ORDER — MIDAZOLAM HYDROCHLORIDE 1 MG/ML
INJECTION, SOLUTION INTRAMUSCULAR; INTRAVENOUS AS NEEDED
Status: DISCONTINUED | OUTPATIENT
Start: 2020-01-09 | End: 2020-01-09 | Stop reason: HOSPADM

## 2020-01-09 RX ORDER — CEFAZOLIN SODIUM/WATER 2 G/20 ML
2 SYRINGE (ML) INTRAVENOUS EVERY 8 HOURS
Status: COMPLETED | OUTPATIENT
Start: 2020-01-09 | End: 2020-01-09

## 2020-01-09 RX ADMIN — TRANEXAMIC ACID 1000 MG: 100 INJECTION, SOLUTION INTRAVENOUS at 07:36

## 2020-01-09 RX ADMIN — PHENYLEPHRINE HYDROCHLORIDE 100 MCG: 10 INJECTION INTRAVENOUS at 08:45

## 2020-01-09 RX ADMIN — BUPIVACAINE HYDROCHLORIDE IN DEXTROSE 13.5 MG: 7.5 INJECTION, SOLUTION SUBARACHNOID at 07:43

## 2020-01-09 RX ADMIN — ACETAMINOPHEN 1000 MG: 10 INJECTION, SOLUTION INTRAVENOUS at 18:03

## 2020-01-09 RX ADMIN — Medication 10 ML: at 21:42

## 2020-01-09 RX ADMIN — GABAPENTIN 300 MG: 300 CAPSULE ORAL at 21:40

## 2020-01-09 RX ADMIN — DEXAMETHASONE SODIUM PHOSPHATE 4 MG: 4 INJECTION, SOLUTION INTRAMUSCULAR; INTRAVENOUS at 07:04

## 2020-01-09 RX ADMIN — Medication 2 G: at 07:27

## 2020-01-09 RX ADMIN — PHENYLEPHRINE HYDROCHLORIDE 50 MCG: 10 INJECTION INTRAVENOUS at 08:16

## 2020-01-09 RX ADMIN — Medication 3 AMPULE: at 06:00

## 2020-01-09 RX ADMIN — HYDROCODONE BITARTRATE AND ACETAMINOPHEN 1 TABLET: 10; 325 TABLET ORAL at 16:15

## 2020-01-09 RX ADMIN — FENTANYL CITRATE 50 MCG: 50 INJECTION INTRAMUSCULAR; INTRAVENOUS at 07:01

## 2020-01-09 RX ADMIN — PHENYLEPHRINE HYDROCHLORIDE 50 MCG: 10 INJECTION INTRAVENOUS at 08:05

## 2020-01-09 RX ADMIN — LORAZEPAM 1 MG: 1 TABLET ORAL at 21:40

## 2020-01-09 RX ADMIN — Medication 1 AMPULE: at 21:39

## 2020-01-09 RX ADMIN — Medication 2 G: at 23:51

## 2020-01-09 RX ADMIN — ASPIRIN 81 MG: 81 TABLET ORAL at 21:39

## 2020-01-09 RX ADMIN — Medication 5 ML: at 23:51

## 2020-01-09 RX ADMIN — HYDROCODONE BITARTRATE AND ACETAMINOPHEN 1 TABLET: 10; 325 TABLET ORAL at 21:44

## 2020-01-09 RX ADMIN — PHENYLEPHRINE HYDROCHLORIDE 50 MCG: 10 INJECTION INTRAVENOUS at 08:24

## 2020-01-09 RX ADMIN — MIDAZOLAM 1 MG: 1 INJECTION INTRAMUSCULAR; INTRAVENOUS at 07:31

## 2020-01-09 RX ADMIN — PROPOFOL 50 MCG/KG/MIN: 10 INJECTION, EMULSION INTRAVENOUS at 07:47

## 2020-01-09 RX ADMIN — PHENYLEPHRINE HYDROCHLORIDE 50 MCG: 10 INJECTION INTRAVENOUS at 08:09

## 2020-01-09 RX ADMIN — ONDANSETRON 4 MG: 2 INJECTION INTRAMUSCULAR; INTRAVENOUS at 07:47

## 2020-01-09 RX ADMIN — ROPIVACAINE HYDROCHLORIDE 20 ML: 2 INJECTION, SOLUTION EPIDURAL; INFILTRATION at 07:04

## 2020-01-09 RX ADMIN — PHENYLEPHRINE HYDROCHLORIDE 50 MCG: 10 INJECTION INTRAVENOUS at 07:51

## 2020-01-09 RX ADMIN — PROPOFOL 30 MG: 10 INJECTION, EMULSION INTRAVENOUS at 08:53

## 2020-01-09 RX ADMIN — Medication 2 G: at 16:15

## 2020-01-09 RX ADMIN — DEXAMETHASONE SODIUM PHOSPHATE 10 MG: 4 INJECTION, SOLUTION INTRAMUSCULAR; INTRAVENOUS at 07:47

## 2020-01-09 RX ADMIN — SODIUM CHLORIDE, SODIUM LACTATE, POTASSIUM CHLORIDE, AND CALCIUM CHLORIDE: 600; 310; 30; 20 INJECTION, SOLUTION INTRAVENOUS at 07:49

## 2020-01-09 RX ADMIN — SODIUM CHLORIDE, SODIUM LACTATE, POTASSIUM CHLORIDE, AND CALCIUM CHLORIDE 75 ML/HR: 600; 310; 30; 20 INJECTION, SOLUTION INTRAVENOUS at 06:01

## 2020-01-09 RX ADMIN — PHENYLEPHRINE HYDROCHLORIDE 50 MCG: 10 INJECTION INTRAVENOUS at 08:02

## 2020-01-09 RX ADMIN — MIDAZOLAM 1 MG: 1 INJECTION INTRAMUSCULAR; INTRAVENOUS at 07:36

## 2020-01-09 RX ADMIN — PHENYLEPHRINE HYDROCHLORIDE 50 MCG: 10 INJECTION INTRAVENOUS at 07:58

## 2020-01-09 RX ADMIN — MIDAZOLAM 2 MG: 1 INJECTION INTRAMUSCULAR; INTRAVENOUS at 07:01

## 2020-01-09 RX ADMIN — DIPHENHYDRAMINE HYDROCHLORIDE 12.5 MG: 50 INJECTION, SOLUTION INTRAMUSCULAR; INTRAVENOUS at 09:06

## 2020-01-09 NOTE — PERIOP NOTES
TRANSFER - IN REPORT:    Verbal report received from Denisse More RN(name) on Kellie Harp  being received from Pre-Op for routine progression of care      Report consisted of patients Situation, Background, Assessment and   Recommendations(SBAR). Information from the following report(s) SBAR and MAR was reviewed with the receiving nurse. Opportunity for questions and clarification was provided. Assessment completed upon patients arrival to unit and care assumed.

## 2020-01-09 NOTE — PERIOP NOTES
Teach back method used with patient concerning antiseptic wash, TB screening, incentive spirometer( 1250 demonstrated preop), and pain management goals.  Patient and family were provided with home discharge needs list.

## 2020-01-09 NOTE — H&P
The patient has end stage arthritis of the right knee joint. The patient was seen and examined and there are no changes to the patient's orthopedic condition. They have tried conservative treatment for this condition; including antiinflammatories and lifestyle modifications and have failed. The necessity for the joint replacement is still present, and the H&P from the office is still current. The patient will be admitted today forProcedure(s) (LRB):  RIGHT NORM KNEE ARTHROPLASTY TOTAL / GREGG PT HAS A PACEMAKER (Right) .

## 2020-01-09 NOTE — ANESTHESIA PROCEDURE NOTES
Peripheral Block    Start time: 1/9/2020 7:01 AM  End time: 1/9/2020 7:04 AM  Performed by: Dewey Ferrell MD  Authorized by: Dewey Ferrell MD       Pre-procedure: Indications: at surgeon's request and post-op pain management    Preanesthetic Checklist: patient identified, risks and benefits discussed, site marked, timeout performed, anesthesia consent given and patient being monitored    Timeout Time: 07:01          Block Type:   Block Type:   Adductor canal  Laterality:  Right  Monitoring:  Responsive to questions, continuous pulse ox, oxygen, frequent vital sign checks and heart rate  Injection Technique:  Single shot  Procedures: ultrasound guided    Patient Position: supine  Prep: chlorhexidine    Location:  Upper thigh  Needle Type:  Stimuplex  Needle Gauge:  21 G  Needle Localization:  Ultrasound guidance    Assessment:  Number of attempts:  1  Injection Assessment:  Incremental injection every 5 mL, negative aspiration for CSF, no paresthesia, ultrasound image on chart, no intravascular symptoms, negative aspiration for blood and local visualized surrounding nerve on ultrasound  Patient tolerance:  Patient tolerated the procedure well with no immediate complications

## 2020-01-09 NOTE — PROGRESS NOTES
Problem: Self Care Deficits Care Plan (Adult)  Goal: *Acute Goals and Plan of Care (Insert Text)  Description  GOALS:   DISCHARGE GOALS (in preparation for going home/rehab):  3 days  1. Ms. Grecia Arredondo will perform one lower body dressing activity with minimal assistance required to demonstrate improved functional mobility and safety. 2.  Ms. Grecia Arredondo will perform one lower body bathing activity with minimal assistance required to demonstrate improved functional mobility and safety. 3.  Ms. Grecia Arredondo will perform toileting/toilet transfer with contact guard assistance to demonstrate improved functional mobility and safety. 4.  Ms. Grecia Arredondo will perform shower transfer with contact guard assistance to demonstrate improved functional mobility and safety. Outcome: Progressing Towards Goal     JOINT CAMP OCCUPATIONAL THERAPY TKA: Initial Assessment, Daily Note, Treatment Day: Day of Assessment, and PM 1/9/2020  INPATIENT: Hospital Day: 1  Payor: Annamaria Francisco / Plan: [address] [place] [address] HMO / Product Type: Managed Care Medicare /      NAME/AGE/GENDER: Nimo Fan is a 69 y.o. female   PRIMARY DIAGNOSIS:  Unilateral primary osteoarthritis, right knee [M17.11]   Procedure(s) and Anesthesia Type:     * RIGHT NORM KNEE ARTHROPLASTY TOTAL - Spinal (Right)  ICD-10: Treatment Diagnosis:    Pain in Right Knee (M25.561)  Stiffness of Right Knee, Not elsewhere classified (B28.102)      ASSESSMENT:     Ms. Grecia Arredondo is s/p right TKA and presents with decreased weight bearing on right LE and decreased independence with functional mobility and activities of daily living as compared to baseline level of function and safety. Patient would benefit from skilled Occupational Therapy to maximize independence and safety with self-care task and functional mobility. Pt would also benefit from education on adaptive equipment and safety precautions in preparation for going home. Pt up to edge of bed and donned clothes.  Pt up to bathroom for toileting and moves well. This section established at most recent assessment   PROBLEM LIST (Impairments causing functional limitations):  Decreased Strength  Decreased ADL/Functional Activities  Decreased Transfer Abilities  Increased Pain  Increased Fatigue  Decreased Flexibility/Joint Mobility  Decreased Knowledge of Precautions   INTERVENTIONS PLANNED: (Benefits and precautions of occupational therapy have been discussed with the patient.)  Activities of daily living training  Adaptive equipment training  Balance training  Clothing management  Donning&doffing training  Theraputic activity     TREATMENT PLAN: Frequency/Duration: Follow patient 1-2 times to address above goals. Rehabilitation Potential For Stated Goals: Good     RECOMMENDED REHABILITATION/EQUIPMENT: (at time of discharge pending progress): Continue Skilled Therapy. OCCUPATIONAL PROFILE AND HISTORY:   History of Present Injury/Illness (Reason for Referral): Pt presents this date s/p (right) TKA. Past Medical History/Comorbidities:   Ms. Grecia Arredondo  has a past medical history of Adopted, Allergic rhinitis, Dyslipidemia, Essential hypertension, GERD (gastroesophageal reflux disease), Nausea & vomiting, PAF (paroxysmal atrial fibrillation) ([address] SSS (sick sinus syndrome) ([address] Type 2 diabetes mellitus ([address] and VHD (valvular heart disease). Ms. Grecia Arredondo  has a past surgical history that includes hx knee replacement (Left, 2014); hx tubal ligation; hx pacemaker placement (10/26/2018); hx dilation and curettage; hx colonoscopy; hx heart catheterization; and hx other surgical.  Social History/Living Environment:      Prior Level of Function/Work/Activity:  Independent      Number of Personal Factors/Comorbidities that affect the Plan of Care: Brief history (0):  LOW COMPLEXITY   ASSESSMENT OF OCCUPATIONAL PERFORMANCE[de-identified]   Most Recent Physical Functioning:   Balance  Sitting: Intact  Standing: Intact; With support Gross Assessment: Yes  Gross Assessment  AROM: Within functional limits(limited R LE)  Strength: Within functional limits(limited R LE)            Coordination  Fine Motor Skills-Upper: Left Intact; Right Intact  Gross Motor Skills-Upper: Left Intact; Right Intact         Mental Status  Neurologic State: Alert  Orientation Level: Oriented X4  Cognition: Appropriate decision making  Perception: Appears intact  Perseveration: No perseveration noted  Safety/Judgement: Awareness of environment                Basic ADLs (From Assessment) Complex ADLs (From Assessment)   Basic ADL  Feeding: Independent  Oral Facial Hygiene/Grooming: Supervision  Bathing: Moderate assistance  Upper Body Dressing: Supervision  Lower Body Dressing: Moderate assistance  Toileting: Minimum assistance     Grooming/Bathing/Dressing Activities of Daily Living     Cognitive Retraining  Safety/Judgement: Awareness of environment                 Functional Transfers  Bathroom Mobility: Minimum assistance  Toilet Transfer : Minimum assistance  Shower Transfer: Minimum assistance     Bed/Mat Mobility  Rolling: Stand-by assistance  Supine to Sit: Contact guard assistance  Sit to Supine: Contact guard assistance  Sit to Stand: Minimum assistance  Stand to Sit: Minimum assistance  Bed to Chair: Minimum assistance  Scooting: Supervision         Physical Skills Involved:  Range of Motion  Balance  Strength Cognitive Skills Affected (resulting in the inability to perform in a timely and safe manner):  none  Psychosocial Skills Affected:  Environmental Adaptation   Number of elements that affect the Plan of Care: 3-5:  MODERATE COMPLEXITY   CLINICAL DECISION MAKIN [facility] [address] [address] AM-PAC 6 Clicks   Daily Activity Inpatient Short Form  How much help from another person does the patient currently need. .. Total A Lot A Little None   1. Putting on and taking off regular lower body clothing? [] 1   [x] 2   [] 3   [] 4   2.   Bathing (including washing, rinsing, drying)? [] 1   [x] 2   [] 3   [] 4   3. Toileting, which includes using toilet, bedpan or urinal?   [] 1   [] 2   [x] 3   [] 4   4. Putting on and taking off regular upper body clothing? [] 1   [] 2   [] 3   [x] 4   5. Taking care of personal grooming such as brushing teeth? [] 1   [] 2   [] 3   [x] 4   6. Eating meals? [] 1   [] 2   [] 3   [x] 4   © 2007, Trustees [address] [address], under license to [employer]. All rights reserved     Score:  Initial: 19 Most Recent: X (Date: -- )    Interpretation of Tool:  Represents activities that are increasingly more difficult (i.e. Bed mobility, Transfers, Gait). Use of outcome tool(s) and clinical judgement create a POC that gives a: LOW COMPLEXITY            TREATMENT:   (In addition to Assessment/Re-Assessment sessions the following treatments were rendered)     Pre-treatment Symptoms/Complaints:  no complaint of pain during therapy  Pain: Initial:     0 Post Session:  0     Self Care: (15 min): Procedure(s) (per grid) utilized to improve and/or restore self-care/home management as related to dressing and toileting. Required minimal verbal and   cueing to facilitate activities of daily living skills and compensatory activities. OT evaluation completed   Treatment/Session Assessment:     Response to Treatment:  pt up in room tolerated well. Education:  [] Home Exercises  [x] Fall Precautions  [] Hip Precautions [] Going Home Video  [x] Knee/Hip Prosthesis Review  [x] Walker Management/Safety [x] Adaptive Equipment as Needed       Interdisciplinary Collaboration:   Physical Therapist  Occupational Therapist  Registered Nurse    After treatment position/precautions:   Up in chair  Bed/Chair-wheels locked  Call light within reach  RN notified     Compliance with Program/Exercises: Compliant all of the time, Will assess as treatment progresses.     Recommendations/Intent for next treatment session:  Treatment next visit will focus on increasing Ms. Syriac's independence with bed mobility, transfers, self care, functional mobility, modalities for pain, and patient education.       Total Treatment Duration:  OT Patient Time In/Time Out  Time In: 1335  Time Out: [address], OT

## 2020-01-09 NOTE — OP NOTES
[facility] Robotic Assisted Total Knee Arthroplasty: Posterior Cruciate Retaining       Umesh Laws   : 1945  Medical Record Number:[id number]      Pre-operative Diagnosis:  Unilateral primary osteoarthritis, right knee [M17.11]  Post-operative Diagnosis: Unilateral primary osteoarthritis, right knee [M17.11]  Location: [place] [address]    Date of Procedure: 2020  Surgeon: Susan Sidhu MD  Assistant:  IGNACIO Mark    Anesthesia: Spinal and  nerve block  BMI:Body mass index is 24.56 kg/m². CPT- 03199- Total knee arthroplasty           50636- Other procedures on musculoskeletal system            0055T- Computer assisted surgical navigation     Procedure:  Right Cemented Posterior Stabilized Total Knee Arthroplasty     Tourniquet Time: none    EBL:  250 cc     Dakota Joseph was brought to the operating room and positioned on the operating table. She was anesthestized with anesthesia. IV antibiotics was administered. Prior to the incision being made a timeout was called identifying the patient, procedure ,operative side and surgeon The operative leg was prepped and draped in the usual sterile manner. An anterior longitudinal incision was accomplished just medial to the tibial tubercle and extending approximal 6 centimeters proximal to the superior pole of the patella. A medial parapatellar capsular incision was performed. The medial capsular flap was elevated around to the insertion of the semimembranous tendon. The patella was everted and the knee flexed and externally rotated. The medial and external menisci were excised. The lateral half of the fat pad excised and the patella femoral ligament was released. The anterior cruciate ligament was resect and the posterior cruciate ligament was retained. The femoral and tibial arrays were pinned in place and registered with the [address]  The patient landmarks were collected and the tibial and femoral checkpoints were registered and verified. The preresection balancing was performed. The osteophytes were then removed as exposed on the femoral and tibial surfaces. Utilizing the [facility] robotic arm the femoral and tibial cuts were accomplished. A notch cut was accomplished. The tibia was sized. The tibial base plate was pinned into place with the appropriate external rotation and stem site prepared. A trial femoral component and poly was placed. A preliminary range of motion was accomplished with the trial components. The patient was found to obtain full extension as well as appropriate flexion. The patient's ligaments were stable in flexion and extension to medial and lateral stressing and the alignment was through the appropriate mechanical axis. Additional surgical procedures included: none. The patella was then everted. Osteophytes were removed. The patella articular surface was inspected and resected to accommodate a 31 mm patella button. All trial components were removed. The real implants were opened: Sizes selected were a size 4 femoral, 3 tibial, and a 11 PS mm polyethylene insert. The knee was irrigated. The real components were cemented into place. [facility] knee was placed through range of motion and noted to be stable as mentioned above with the trial components. The operative knee was injected with 60 cc of Naropin, 10 cc's of morphine and 1 cc of 30 mg of Toradol. The knee was then soaked with a diluted betadine solution for approximately 3 min. This was then thoroughly irrigated. The capsular layer was closed using a #1 PDS suture and a #1 Vicryl. Then, 1 gram (100 mg/ml) of Transexamic Acid and 1 gram of Vancomycin was injected into the joint space. The subcutaneous layers were closed using 2-0 vicryl. The skin was closed using staples which were applied in occlusive fashion and sterile bandage applied.   An [employer] cryo pad was applied on the operative leg. Sponge count and needle counts were correct. Sasha Winston left the operating room     Implants:   Implant Name Type Inv.  Item Serial No.  Lot No. LRB No. Used   CEMENT BNE SIMPLEX W/GENT -- 10/PK - [id number]  CEMENT BNE SIMPLEX W/GENT -- 10/PK [id number] [employer] HOWM [id number] Right 1   COMPNT PAT SYM TRIATHLN 29X8MM --  - [id number]  COMPNT PAT SYM TRIATHLN 29X8MM --  [id number] [employer] HOW [id number] Right 1   COMPNT FEM PS MALVIN TRIATHLN 4 R --  - [id number]  COMPNT FEM PS MALVIN TRIATHLN 4 R --  [id number] [employer] HOW [id number] Right 1   BASEPLT TIB UNIV TRIATHLN 3 --  - [id number]  BASEPLT TIB UNIV TRIATHLN 3 --  [id number] [employer] HOW [id number] Right 1   INSERT TIB PS TRIATHLN 3 11MM --  - [id number]  INSERT TIB PS TRIATHLN 3 11MM --  [id number] [employer] HOW [id number] Right 1         Signed By: Carson Perez MD   1/9/2020,  9:13 AM

## 2020-01-09 NOTE — PROGRESS NOTES
TRANSFER - IN REPORT:    Verbal report received from [facility]) on Russell Tsang  being received from [email or web address]) for routine post - op      Report consisted of patients Situation, Background, Assessment and   Recommendations(SBAR). Information from the following report(s) SBAR, Kardex, OR Summary, Procedure Summary, Intake/Output and MAR was reviewed with the receiving nurse. Opportunity for questions and clarification was provided. Assessment completed upon patients arrival to unit and care assumed.

## 2020-01-09 NOTE — PERIOP NOTES
TRANSFER - OUT REPORT:    Verbal report given to [facility], RN on Mary Grace Yu  being transferred to [facility] for routine progression of care       Report consisted of patients Situation, Background, Assessment and   Recommendations(SBAR). Information from the following report(s) Kardex, MAR and Recent Results was reviewed with the receiving nurse. Lines:   Peripheral IV 01/09/20 Left Arm (Active)   Site Assessment Clean, dry, & intact 1/9/2020  6:15 AM   Phlebitis Assessment 0 1/9/2020  6:15 AM   Infiltration Assessment 0 1/9/2020  6:15 AM   Dressing Status Clean, dry, & intact 1/9/2020  6:15 AM   Dressing Type Transparent 1/9/2020  6:15 AM   Hub Color/Line Status Pink;Patent; Infusing 1/9/2020  6:15 AM        Opportunity for questions and clarification was provided.       Patient transported with:   [employer]

## 2020-01-09 NOTE — PROGRESS NOTES
[facility]  Face to Face Encounter    Patients Name: Kaylie Castanon    YOB: 1945    Ordering Physician: Jeff Fuchs    Primary Diagnosis: Unilateral primary osteoarthritis, right knee [M17.11]  Arthritis of right knee [M17.11]    Date of Face to Face:   1/9/2020                                  Face to Face Encounter findings are related to primary reason for home care:   yes. 1. I certify that the patient needs intermittent care as follows: physical therapy: strengthening and stretching/ROM    2. I certify that this patient is homebound, that is: 1) patient requires the use of a walker device, special transportation, or assistance of another to leave the home; or 2) patient's condition makes leaving the home medically contraindicated; and 3) patient has a normal inability to leave the home and leaving the home requires considerable and taxing effort. Patient may leave the home for infrequent and short duration for medical reasons, and occasional absences for non-medical reasons. Homebound status is due to the following functional limitations: Patient currently under activity restrictions secondary to recent surgical procedure, this hinders their ability to safely leave the home. 3. I certify that this patient is under my care and that I, or a nurse practitioner or  [id number], or clinical nurse specialist, or certified nurse midwife, working with me, had a Face-to-Face Encounter that meets the physician Face-to-Face Encounter requirements. The following are the clinical findings from the [address] [place] [address] encounter that support the need for skilled services and is a summary of the encounter: progress notes     See attached progess note      MONIQUE Moyer  1/9/2020      THE FOLLOWING TO BE COMPLETED BY THE COMMUNITY PHYSICIAN:    I concur with the findings described above from the [facility] encounter that this patient is homebound and in need of a skilled service.     Certifying Physician: _____________________________________      Printed Certifying Physician Name: _____________________________________    Date: _________________

## 2020-01-09 NOTE — PROGRESS NOTES
Problem: Mobility Impaired (Adult and Pediatric)  Goal: *Acute Goals and Plan of Care (Insert Text)  Description  GOALS (1-4 days):  (1.)Ms. Grecai Arredondo will move from supine to sit and sit to supine  in bed with SUPERVISION. (2.)Ms. Grecia Arredondo will transfer from bed to chair and chair to bed with SUPERVISION using the least restrictive device. (3.)Ms. Grecia Arredondo will ambulate with SUPERVISION for 200 feet with the least restrictive device. (4.)Ms. Grecia Arredondo will ambulate up/down 3 steps with bilateral  railing with STAND BY ASSIST with no device. (5.)Ms. Grecia Arredondo will increase right knee ROM to 5°-80°.  ________________________________________________________________________________________________   Outcome: Progressing Towards Goal       PHYSICAL THERAPY JOINT CAMP TKA: Initial Assessment and PM 1/9/2020  INPATIENT: Hospital Day: 1  Payor: Mariana Oneil / Plan: [address] [place] [address] HMO / Product Type: [employer] /      NAME/AGE/GENDER: Ngoc Perez is a 69 y.o. female   PRIMARY DIAGNOSIS:  Unilateral primary osteoarthritis, right knee [M17.11]   Procedure(s) and Anesthesia Type:     * RIGHT NORM KNEE ARTHROPLASTY TOTAL - Spinal (Right)  ICD-10: Treatment Diagnosis:    Pain in Right Knee (M25.561)  Stiffness of Right Knee, Not elsewhere classified (M25.661)  Difficulty in walking, Not elsewhere classified (R26.2)      ASSESSMENT:     Ms. Grecia Arredondo presents with limited ROM and strength following her R TKA. She will benefit from PT to increase her functional independence with gait and transfers. She ambulated with therapy and then returned to the bedside chair to participate in therex. She plans on going home with HHPT at discharge.      This section established at most recent assessment   PROBLEM LIST (Impairments causing functional limitations):  Decreased Strength  Decreased Transfer Abilities  Decreased Ambulation Ability/Technique  Decreased Flexibility/Joint Mobility   INTERVENTIONS PLANNED: (Benefits and precautions of physical therapy have been discussed with the patient.)  Cold  bed mobility  gait training  home exercise program (HEP)  Range of Motion: active/assisted/passive  Therapeutic Activities  therapeutic exercise/strengthening  transfer training  Group Therapy     TREATMENT PLAN: Frequency/Duration: Follow patient BID for duration of hospital stay to address above goals. Rehabilitation Potential For Stated Goals: Excellent     RECOMMENDED REHABILITATION/EQUIPMENT: (at time of discharge pending progress): Continue Skilled Therapy and Home Health: Physical Therapy. HISTORY:   History of Present Injury/Illness (Reason for Referral): Admitted for R TKA  Past Medical History/Comorbidities:   Ms. Grecia Arredondo  has a past medical history of Adopted, Allergic rhinitis, Dyslipidemia, Essential hypertension, GERD (gastroesophageal reflux disease), Nausea & vomiting, PAF (paroxysmal atrial fibrillation) ([facility] [address] SSS (sick sinus syndrome) ([facility] [address] Type 2 diabetes mellitus ([facility] [address] and VHD (valvular heart disease). Ms. Grecia Arredondo  has a past surgical history that includes hx knee replacement (Left, 2014); hx tubal ligation; hx pacemaker placement (10/26/2018); hx dilation and curettage; hx colonoscopy; hx heart catheterization; and hx other surgical.  Social History/Living Environment:      Prior Level of Function/Work/Activity:  Lives at home with her  and was independent prior to admit. Number of Personal Factors/Comorbidities that affect the Plan of Care: 0: LOW COMPLEXITY   EXAMINATION:   Most Recent Physical Functioning:   Gross Assessment: Yes  Gross Assessment  AROM: Within functional limits(limited R LE)  Strength:  Within functional limits(limited R LE)                     Bed Mobility  Rolling: Stand-by assistance  Supine to Sit: Contact guard assistance  Sit to Supine: Contact guard assistance  Scooting: Supervision    Transfers  Sit to Stand: Minimum assistance  Stand to Sit: Minimum assistance  Bed to Chair: Minimum assistance    Balance  Sitting: Intact  Standing: Pull to stand; With support              Weight Bearing Status  Right Side Weight Bearing: As tolerated  Distance (ft): 60 Feet (ft)  Ambulation - Level of Assistance: Minimal assistance  Assistive Device: Walker, rolling  Speed/Maria Del Carmen: Pace decreased (<100 feet/min)  Step Length: Left shortened  Stance: Right decreased  Gait Abnormalities: Antalgic;Decreased step clearance; Step to gait  Interventions: Verbal cues; Safety awareness training     Braces/Orthotics: none    Right Knee Cold  Type: Cryocuff      Body Structures Involved:  Joints  Muscles Body Functions Affected: Movement Related Activities and Participation Affected: Mobility   Number of elements that affect the Plan of Care: 4+: HIGH COMPLEXITY   CLINICAL PRESENTATION:   Presentation: Stable and uncomplicated: LOW COMPLEXITY   CLINICAL DECISION MAKIN[address] [place] [id number] AM-PAC 6 Clicks   Basic Mobility Inpatient Short Form  How much difficulty does the patient currently have. .. Unable A Lot A Little None   1. Turning over in bed (including adjusting bedclothes, sheets and blankets)? [] 1   [] 2   [x] 3   [] 4   2. Sitting down on and standing up from a chair with arms ( e.g., wheelchair, bedside commode, etc.)   [] 1   [] 2   [x] 3   [] 4   3. Moving from lying on back to sitting on the side of the bed? [] 1   [] 2   [x] 3   [] 4   How much help from another person does the patient currently need. .. Total A Lot A Little None   4. Moving to and from a bed to a chair (including a wheelchair)? [] 1   [] 2   [x] 3   [] 4   5. Need to walk in hospital room? [] 1   [] 2   [x] 3   [] 4   6. Climbing 3-5 steps with a railing? [] 1   [] 2   [x] 3   [] 4   © , Trustees of [address] [place] [id number], under license to [employer].  All rights reserved     Score:  Initial: 18 Most Recent: X (Date: -- )    Interpretation of Tool:  Represents activities that are increasingly more difficult (i.e. Bed mobility, Transfers, Gait). Medical Necessity:     Patient is expected to demonstrate progress in   strength, range of motion, and functional technique   to   increase independence with gait and transfers   . Reason for Services/Other Comments:  Patient continues to require skilled intervention due to   Limited functional independence   . Use of outcome tool(s) and clinical judgement create a POC that gives a: Clear prediction of patient's progress: LOW COMPLEXITY            TREATMENT:   (In addition to Assessment/Re-Assessment sessions the following treatments were rendered)     Pre-treatment Symptoms/Complaints:  some knee pain  Pain Initial:   Pain Intensity 1: 4  Post Session:  4     Therapeutic Exercise: (10 Minutes):  Exercises per grid below to improve mobility and strength. Required minimal verbal cues to promote proper body alignment. Date:  1/9/20 Date:   Date:     ACTIVITY/EXERCISE AM PM AM PM AM PM   GROUP THERAPY  []  []  []  []  []  []   Ankle Pumps  10       Quad Sets  10       Gluteal Sets  10       Hip ABd/ADduction  10       Straight Leg Raises  10       Knee Slides  10       Short Arc Quads         Long Arc Quads         Chair Slides                  B = bilateral; AA = active assistive; A = active; P = passive      Treatment/Session Assessment:     Response to Treatment:  tolerated therapy. Education:  [x] Home Exercises  [x] Fall Precautions  []  [] D/C Instruction Review  [] Knee Prosthesis Review  [x] Walker Management/Safety [] Adaptive Equipment as Needed       Interdisciplinary Collaboration:   Physical Therapist  Occupational Therapist  Registered Nurse    After treatment position/precautions:   Up in chair  Bed/Chair-wheels locked  Bed in low position  Call light within reach  RN notified    Compliance with Program/Exercises: Compliant all of the time.     Recommendations/Intent for next treatment session:  Treatment next visit will focus on increasing Ms. Italian's independence with bed mobility, transfers, gait training, strength/ROM exercises, modalities for pain, and patient education.       Total Treatment Duration:  PT Patient Time In/Time Out  Time In: 1310  Time Out: 97 Cathi Bañuelos, PT

## 2020-01-09 NOTE — ANESTHESIA PROCEDURE NOTES
Spinal Block    Start time: 1/9/2020 7:39 AM  End time: 1/9/2020 7:43 AM  Performed by: Smooth Loza MD  Authorized by: Smooth Loza MD     Pre-procedure:   Indications: primary anesthetic  Preanesthetic Checklist: patient identified, risks and benefits discussed, anesthesia consent, patient being monitored and timeout performed    Timeout Time: 07:39          Spinal Block:   Patient Position:  Seated  Prep Region:  Lumbar  Prep: chlorhexidine and patient draped      Location:  L3-4  Technique:  Single shot    Local Dose (mL):  3    Needle:   Needle Type:  Pencan  Needle Gauge:  25 G  Attempts:  1      Events: CSF confirmed, no blood with aspiration and no paresthesia        Assessment:  Insertion:  Uncomplicated  Patient tolerance:  Patient tolerated the procedure well with no immediate complications

## 2020-01-09 NOTE — ANESTHESIA POSTPROCEDURE EVALUATION
Procedure(s):  RIGHT NORM KNEE ARTHROPLASTY TOTAL.     spinal    Anesthesia Post Evaluation      Multimodal analgesia: multimodal analgesia used between 6 hours prior to anesthesia start to PACU discharge  Patient location during evaluation: PACU  Patient participation: complete - patient participated  Level of consciousness: responsive to verbal stimuli and awake  Pain management: adequate  Airway patency: patent  Anesthetic complications: no  Cardiovascular status: acceptable  Respiratory status: acceptable, spontaneous ventilation and nonlabored ventilation  Hydration status: acceptable  Post anesthesia nausea and vomiting:  none      Vitals Value Taken Time   /61 1/9/2020 10:12 AM   Temp 37.2 °C (99 °F) 1/9/2020  9:58 AM   Pulse 60 1/9/2020 10:12 AM   Resp 16 1/9/2020 10:12 AM   SpO2 100 % 1/9/2020 10:12 AM

## 2020-01-09 NOTE — ANESTHESIA PREPROCEDURE EVALUATION
Relevant Problems   No relevant active problems       Anesthetic History     PONV          Review of Systems / Medical History  Patient summary reviewed, nursing notes reviewed and pertinent labs reviewed    Pulmonary  Within defined limits                 Neuro/Psych   Within defined limits           Cardiovascular    Hypertension: well controlled        Dysrhythmias (SSS/ bradycardia s/p PM)   Pacemaker    Exercise tolerance: >4 METS     GI/Hepatic/Renal     GERD: well controlled           Endo/Other    Diabetes: type 2    Arthritis     Other Findings            Physical Exam    Airway  Mallampati: II    Neck ROM: normal range of motion   Mouth opening: Normal     Cardiovascular  Regular rate and rhythm,  S1 and S2 normal,  no murmur, click, rub, or gallop             Dental    Dentition: Full upper dentures and Full lower dentures     Pulmonary  Breath sounds clear to auscultation               Abdominal         Other Findings            Anesthetic Plan    ASA: 2  Anesthesia type: spinal - femoral single shot      Post-op pain plan if not by surgeon: peripheral nerve block single    Induction: Intravenous  Anesthetic plan and risks discussed with: Patient

## 2020-01-09 NOTE — PERIOP NOTES
TRANSFER - IN REPORT:    Verbal report received from Moreno RN on Dakota Joseph  being received from [facility](unit) for routine progression of care      Report consisted of patients Situation, Background, Assessment and   Recommendations(SBAR). Information from the following report(s) SBAR and MAR was reviewed with the receiving nurse. Opportunity for questions and clarification was provided. Assessment completed upon patients arrival to unit and care assumed.

## 2020-01-09 NOTE — PROGRESS NOTES
Patient arrived via bed into room. Patient alert and oriented. Patient able to dorsi/flex lower extremities due to surgery. Pedal pulses present 2+. SCD applied. Neurovascular status WNL. Admission assessment complete. Discuss diet and pain. Bed is low and locked. Call light within reach. Instructed to call for assistance.  at bedside.

## 2020-01-09 NOTE — PROGRESS NOTES
01/09/20 1345   Oxygen Therapy   O2 Sat (%) 96 %   Incentive Spirometry Treatment   Actual Volume (ml) 1200 ml   Patient achieved   1200              Ml/sec on IS. Patient encouraged to do every hour while awake-patient agreed and demonstrated. No shortness of breath or distress noted. BS are clear b/l.    [facility] notes reviewed- continuous sat #7 ordered HS

## 2020-01-09 NOTE — PERIOP NOTES
Betadine lavage:  17.5cc of betadine lot # [id number], exp. Date 04/2021,  in 500cc of . 9NS Lot # [id number], exp.  Date : 06/01/2022

## 2020-01-09 NOTE — PROGRESS NOTES
Care Management Interventions  PCP Verified by CM: Yes  Transition of Care Consult (CM Consult): [address]: Yes  Physical Therapy Consult: Yes  Occupational Therapy Consult: No  Speech Therapy Consult: No  Current Support Network: Lives with Spouse  Confirm Follow Up Transport: Family  The Plan for Transition of Care is Related to the Following Treatment Goals : [place]  The Patient and/or Patient Representative was Provided with a Choice of Provider and Agrees with the Discharge Plan?: Yes  Freedom of Choice List was Provided with Basic Dialogue that Supports the Patient's Individualized Plan of Care/Goals, Treatment Preferences and Shares the Quality Data Associated with the Providers?: Yes  [place] Resource Information Provided?: No  Discharge Location  Discharge Placement: Home with home health    SW met with pt in Prehab and postop to discuss Right TKA. Pt will go home with [facility]. F2F completed. Pt has a RW for home use but needs a BSC. BSC ordered and delivered to pt's room from [facility]   No additional needs or question identified at this time.    Vik Records

## 2020-01-10 VITALS
WEIGHT: 130 LBS | HEIGHT: 61 IN | RESPIRATION RATE: 16 BRPM | HEART RATE: 71 BPM | DIASTOLIC BLOOD PRESSURE: 71 MMHG | TEMPERATURE: 97.5 F | SYSTOLIC BLOOD PRESSURE: 118 MMHG | BODY MASS INDEX: 24.55 KG/M2 | OXYGEN SATURATION: 97 %

## 2020-01-10 LAB
EST. AVERAGE GLUCOSE BLD GHB EST-MCNC: 163 MG/DL
HBA1C MFR BLD: 7.3 %
HGB BLD-MCNC: 8.2 G/DL (ref 11.7–15.4)

## 2020-01-10 PROCEDURE — 74011250637 HC RX REV CODE- 250/637: Performed by: PHYSICIAN ASSISTANT

## 2020-01-10 PROCEDURE — 94760 N-INVAS EAR/PLS OXIMETRY 1: CPT

## 2020-01-10 PROCEDURE — 85018 HEMOGLOBIN: CPT

## 2020-01-10 PROCEDURE — 97535 SELF CARE MNGMENT TRAINING: CPT

## 2020-01-10 PROCEDURE — 97116 GAIT TRAINING THERAPY: CPT

## 2020-01-10 PROCEDURE — 83036 HEMOGLOBIN GLYCOSYLATED A1C: CPT

## 2020-01-10 PROCEDURE — 97110 THERAPEUTIC EXERCISES: CPT

## 2020-01-10 PROCEDURE — 36415 COLL VENOUS BLD VENIPUNCTURE: CPT

## 2020-01-10 PROCEDURE — 97150 GROUP THERAPEUTIC PROCEDURES: CPT

## 2020-01-10 RX ORDER — HYDROCODONE BITARTRATE AND ACETAMINOPHEN 10; 325 MG/1; MG/1
1 TABLET ORAL
Qty: 50 TAB | Refills: 0 | Status: SHIPPED | OUTPATIENT
Start: 2020-01-10 | End: 2020-01-13

## 2020-01-10 RX ORDER — ASPIRIN 81 MG/1
81 TABLET ORAL EVERY 12 HOURS
Qty: 60 TAB | Refills: 0 | Status: SHIPPED | OUTPATIENT
Start: 2020-01-10

## 2020-01-10 RX ADMIN — Medication 5 ML: at 06:28

## 2020-01-10 RX ADMIN — HYDROCODONE BITARTRATE AND ACETAMINOPHEN 1 TABLET: 10; 325 TABLET ORAL at 02:35

## 2020-01-10 RX ADMIN — PANTOPRAZOLE SODIUM 40 MG: 40 TABLET, DELAYED RELEASE ORAL at 06:27

## 2020-01-10 RX ADMIN — ASPIRIN 81 MG: 81 TABLET ORAL at 08:19

## 2020-01-10 RX ADMIN — HYDROCODONE BITARTRATE AND ACETAMINOPHEN 1 TABLET: 10; 325 TABLET ORAL at 11:11

## 2020-01-10 RX ADMIN — SENNOSIDES AND DOCUSATE SODIUM 2 TABLET: 8.6; 5 TABLET ORAL at 08:19

## 2020-01-10 RX ADMIN — Medication 1 AMPULE: at 08:20

## 2020-01-10 RX ADMIN — GABAPENTIN 300 MG: 300 CAPSULE ORAL at 08:20

## 2020-01-10 RX ADMIN — HYDROCODONE BITARTRATE AND ACETAMINOPHEN 1 TABLET: 10; 325 TABLET ORAL at 06:27

## 2020-01-10 RX ADMIN — HYDROCODONE BITARTRATE AND ACETAMINOPHEN 1 TABLET: 10; 325 TABLET ORAL at 14:51

## 2020-01-10 NOTE — PROGRESS NOTES
Initial visit by  to convey care and concern and encourage patient that  services are available if desired. Ms. Grecia Arredondo expressed appreciation for the visit and I offered prayer as requested. No needs were voiced during the visit. Provided 's business card for future reference. Chaplains remain available for support.      Petty Antonio 69  Board Certified

## 2020-01-10 NOTE — PROGRESS NOTES
Problem: Mobility Impaired (Adult and Pediatric)  Goal: *Acute Goals and Plan of Care (Insert Text)  Description  GOALS (1-4 days):  (1.)Ms. Grecia Arredondo will move from supine to sit and sit to supine  in bed with SUPERVISION. (2.)Ms. Grecia Arredondo will transfer from bed to chair and chair to bed with SUPERVISION using the least restrictive device. (3.)Ms. Grecia Arredondo will ambulate with SUPERVISION for 200 feet with the least restrictive device. (4.)Ms. Grecia Arredondo will ambulate up/down 3 steps with bilateral  railing with STAND BY ASSIST with no device. (5.)Ms. Grecia Arredondo will increase right knee ROM to 5°-80°.  ________________________________________________________________________________________________   Outcome: Progressing Towards Goal       PHYSICAL THERAPY JOINT CAMP TKA: Daily Note and PM 1/10/2020  INPATIENT: Hospital Day: 2  Payor: Jose Khan / Plan: [address] [place] [address] HMO / Product Type: Managed Care Medicare /      NAME/AGE/GENDER: Keiko Kyle is a 69 y.o. female   PRIMARY DIAGNOSIS:  Unilateral primary osteoarthritis, right knee [M17.11]   Procedure(s) and Anesthesia Type:     * RIGHT NORM KNEE ARTHROPLASTY TOTAL - Spinal (Right)  ICD-10: Treatment Diagnosis:    · Pain in Right Knee (M25.561)  · Stiffness of Right Knee, Not elsewhere classified (M25.661)  · Difficulty in walking, Not elsewhere classified (R26.2)      ASSESSMENT:     Ms. Grecia Arredondo presents with limited ROM and strength following her R TKA. She will benefit from PT to increase her functional independence with gait and transfers. She ambulated with therapy and then returned to the bedside chair to participate in therex. She plans on going home with HHPT at discharge. Did well with therapy this morning. Verbal cues for gait sequencing but good with endurance and increasing balance with gait. She plans on going home today after the afternoon therapy. Increased gait distance with therapy this afternoon and is going home today.      This section established at most recent assessment   PROBLEM LIST (Impairments causing functional limitations):  1. Decreased Strength  2. Decreased Transfer Abilities  3. Decreased Ambulation Ability/Technique  4. Decreased Flexibility/Joint Mobility   INTERVENTIONS PLANNED: (Benefits and precautions of physical therapy have been discussed with the patient.)  1. Cold  2. bed mobility  3. gait training  4. home exercise program (HEP)  5. Range of Motion: active/assisted/passive  6. Therapeutic Activities  7. therapeutic exercise/strengthening  8. transfer training  9. Group Therapy     TREATMENT PLAN: Frequency/Duration: Follow patient BID for duration of hospital stay to address above goals. Rehabilitation Potential For Stated Goals: Excellent     RECOMMENDED REHABILITATION/EQUIPMENT: (at time of discharge pending progress): Continue Skilled Therapy and Home Health: Physical Therapy. HISTORY:   History of Present Injury/Illness (Reason for Referral): Admitted for R TKA  Past Medical History/Comorbidities:   Ms. Grecia Arredondo  has a past medical history of Adopted, Allergic rhinitis, Dyslipidemia, Essential hypertension, GERD (gastroesophageal reflux disease), Nausea & vomiting, PAF (paroxysmal atrial fibrillation) ([address] SSS (sick sinus syndrome) ([facility] [address] Type 2 diabetes mellitus ([facility] [address] and VHD (valvular heart disease). Ms. Grecia Arredondo  has a past surgical history that includes hx knee replacement (Left, 2014); hx tubal ligation; hx pacemaker placement (10/26/2018); hx dilation and curettage; hx colonoscopy; hx heart catheterization; and hx other surgical.  Social History/Living Environment:      Prior Level of Function/Work/Activity:  Lives at home with her  and was independent prior to admit.    Number of Personal Factors/Comorbidities that affect the Plan of Care: 0: LOW COMPLEXITY   EXAMINATION:   Most Recent Physical Functioning:               RLE AROM  R Knee Flexion: 112  R Knee Extension: 8            Bed Mobility  Rolling: Stand-by assistance  Supine to Sit: Contact guard assistance    Transfers  Sit to Stand: Supervision  Stand to Sit: Supervision  Bed to Chair: Supervision    Balance  Sitting: Intact  Standing: Intact; Without support              Weight Bearing Status  Right Side Weight Bearing: As tolerated  Distance (ft): 285 Feet (ft)(walked distance twice)  Ambulation - Level of Assistance: Stand-by assistance  Assistive Device: Walker, rolling  Speed/Maria Del Carmen: Delayed  Step Length: Left shortened  Stance: Right decreased  Gait Abnormalities: Antalgic;Decreased step clearance  Interventions: Safety awareness training     Braces/Orthotics: none    Right Knee Cold  Type: Cryocuff      Body Structures Involved:  1. Joints  2. Muscles Body Functions Affected:  1. Movement Related Activities and Participation Affected:  1. Mobility   Number of elements that affect the Plan of Care: 4+: HIGH COMPLEXITY   CLINICAL PRESENTATION:   Presentation: Stable and uncomplicated: LOW COMPLEXITY   CLINICAL DECISION MAKIN[address] [place] [id number] AM-PAC 6 Clicks   Basic Mobility Inpatient Short Form  How much difficulty does the patient currently have. .. Unable A Lot A Little None   1. Turning over in bed (including adjusting bedclothes, sheets and blankets)? [] 1   [] 2   [x] 3   [] 4   2. Sitting down on and standing up from a chair with arms ( e.g., wheelchair, bedside commode, etc.)   [] 1   [] 2   [x] 3   [] 4   3. Moving from lying on back to sitting on the side of the bed? [] 1   [] 2   [x] 3   [] 4   How much help from another person does the patient currently need. .. Total A Lot A Little None   4. Moving to and from a bed to a chair (including a wheelchair)? [] 1   [] 2   [x] 3   [] 4   5. Need to walk in hospital room? [] 1   [] 2   [x] 3   [] 4   6. Climbing 3-5 steps with a railing? [] 1   [] 2   [x] 3   [] 4   © , Trustees of [address] [place] [id number], under license to [employer].  All rights reserved Score:  Initial: 18 Most Recent: X (Date: -- )    Interpretation of Tool:  Represents activities that are increasingly more difficult (i.e. Bed mobility, Transfers, Gait). Medical Necessity:     · Patient is expected to demonstrate progress in   · strength, range of motion, and functional technique  ·  to   · increase independence with gait and transfers   · . Reason for Services/Other Comments:  · Patient continues to require skilled intervention due to   · Limited functional independence   · . Use of outcome tool(s) and clinical judgement create a POC that gives a: Clear prediction of patient's progress: LOW COMPLEXITY            TREATMENT:   (In addition to Assessment/Re-Assessment sessions the following treatments were rendered)     Pre-treatment Symptoms/Complaints:  some knee pain  Pain Initial:   Pain Intensity 1: 4  Post Session:  4   Gait Training (15 Minutes):  Gait training to improve and/or restore physical functioning as related to mobility and strength. Ambulated 285 Feet (ft)(walked distance twice) with Stand-by assistance using a Walker, rolling and minimal Safety awareness training related to their stance phase to promote proper body alignment. Therapeutic Exercise: (45 Minutes(group therapy)):  Exercises per grid below to improve mobility and strength. Required minimal verbal cues to promote proper body alignment. Date:  1/9/20 Date:  1/10 Date:     ACTIVITY/EXERCISE AM PM AM PM AM PM   GROUP THERAPY  []  []  []  [x]  []  []   Ankle Pumps  10 15 15     Quad Sets  10 15 15     Gluteal Sets  10 15 15     Hip ABd/ADduction  10 15 15     Straight Leg Raises  10 15 15     Knee Slides  10 15 15     Short Arc Quads    15     Long Arc Quads         Chair Slides    15              B = bilateral; AA = active assistive; A = active; P = passive      Treatment/Session Assessment:     Response to Treatment:  tolerated therapy.      Education:  [x] Home Exercises  [x] Fall Precautions  []  [x] D/C Instruction Review  [x] Knee Prosthesis Review  [x] Walker Management/Safety [] Adaptive Equipment as Needed       Interdisciplinary Collaboration:   o Physical Therapist  o Physical Therapy Assistant  o Registered Nurse    After treatment position/precautions:   o Up in chair  o Bed/Chair-wheels locked  o Bed in low position  o Call light within reach  o RN notified    Compliance with Program/Exercises: Compliant all of the time. Recommendations/Intent for next treatment session:  Treatment next visit will focus on increasing Ms. Laws's independence with bed mobility, transfers, gait training, strength/ROM exercises, modalities for pain, and patient education.       Total Treatment Duration:  PT Patient Time In/Time Out  Time In: 1300  Time Out: Diego Coburn [address], PT

## 2020-01-10 NOTE — PROGRESS NOTES
01/09/20 1951   Oxygen Therapy   O2 Sat (%) 96 %   Pulse via Oximetry 70 beats per minute   O2 Device Room air   Incentive Spirometry Treatment   Actual Volume (ml) 1200 ml   Number of Attempts Greater than 3      Continuous sat placed on patient. Monitor history cleared and alarms set per protocol. Patient achieved 1200 ml on IS.

## 2020-01-10 NOTE — PROGRESS NOTES
Patient resting quietly, alert and oriented, no distress noted. Right knee dressing c/d/i, voiding adequately. Patient encouraged to use incentive spirometer. Fresh ice placed in iceman. Neurovascular and peripheral vascular checks WNL. Bed low and locked position. Call light within reach. Patient instructed to call for assistance, verbalizes understanding. Nursing assessment complete.

## 2020-01-10 NOTE — PROGRESS NOTES
Problem: Mobility Impaired (Adult and Pediatric)  Goal: *Acute Goals and Plan of Care (Insert Text)  Description  GOALS (1-4 days):  (1.)Ms. Grecia Arredondo will move from supine to sit and sit to supine  in bed with SUPERVISION. (2.)Ms. Grecia Arredondo will transfer from bed to chair and chair to bed with SUPERVISION using the least restrictive device. (3.)Ms. Grecia Arredondo will ambulate with SUPERVISION for 200 feet with the least restrictive device. (4.)Ms. Grecia Arredondo will ambulate up/down 3 steps with bilateral  railing with STAND BY ASSIST with no device. (5.)Ms. Grecia Arredondo will increase right knee ROM to 5°-80°.  ________________________________________________________________________________________________   Outcome: Progressing Towards Goal       PHYSICAL THERAPY JOINT CAMP TKA: Daily Note and AM 1/10/2020  INPATIENT: Hospital Day: 2  Payor: Nimisha Gallo / Plan: [address] [place] [address] HMO / Product Type: [employer] Medicare /      NAME/AGE/GENDER: Adilia Healy is a 69 y.o. female   PRIMARY DIAGNOSIS:  Unilateral primary osteoarthritis, right knee [M17.11]   Procedure(s) and Anesthesia Type:     * RIGHT NORM KNEE ARTHROPLASTY TOTAL - Spinal (Right)  ICD-10: Treatment Diagnosis:    · Pain in Right Knee (M25.561)  · Stiffness of Right Knee, Not elsewhere classified (M25.661)  · Difficulty in walking, Not elsewhere classified (R26.2)      ASSESSMENT:     Ms. Grecia Arredondo presents with limited ROM and strength following her R TKA. She will benefit from PT to increase her functional independence with gait and transfers. She ambulated with therapy and then returned to the bedside chair to participate in therex. She plans on going home with HHPT at discharge. Did well with therapy this morning. Verbal cues for gait sequencing but good with endurance and increasing balance with gait. She plans on going home today after the afternoon therapy.     This section established at most recent assessment   PROBLEM LIST (Impairments causing functional limitations):  1. Decreased Strength  2. Decreased Transfer Abilities  3. Decreased Ambulation Ability/Technique  4. Decreased Flexibility/Joint Mobility   INTERVENTIONS PLANNED: (Benefits and precautions of physical therapy have been discussed with the patient.)  1. Cold  2. bed mobility  3. gait training  4. home exercise program (HEP)  5. Range of Motion: active/assisted/passive  6. Therapeutic Activities  7. therapeutic exercise/strengthening  8. transfer training  9. Group Therapy     TREATMENT PLAN: Frequency/Duration: Follow patient BID for duration of hospital stay to address above goals. Rehabilitation Potential For Stated Goals: Excellent     RECOMMENDED REHABILITATION/EQUIPMENT: (at time of discharge pending progress): Continue Skilled Therapy and Home Health: Physical Therapy. HISTORY:   History of Present Injury/Illness (Reason for Referral): Admitted for R TKA  Past Medical History/Comorbidities:   Ms. Grecia Arredondo  has a past medical history of Adopted, Allergic rhinitis, Dyslipidemia, Essential hypertension, GERD (gastroesophageal reflux disease), Nausea & vomiting, PAF (paroxysmal atrial fibrillation) ([address] SSS (sick sinus syndrome) ([address] Type 2 diabetes mellitus ([address] and VHD (valvular heart disease). Ms. Grecia Arredondo  has a past surgical history that includes hx knee replacement (Left, 2014); hx tubal ligation; hx pacemaker placement (10/26/2018); hx dilation and curettage; hx colonoscopy; hx heart catheterization; and hx other surgical.  Social History/Living Environment:      Prior Level of Function/Work/Activity:  Lives at home with her  and was independent prior to admit.    Number of Personal Factors/Comorbidities that affect the Plan of Care: 0: LOW COMPLEXITY   EXAMINATION:   Most Recent Physical Functioning:                                 Transfers  Sit to Stand: Stand-by assistance  Stand to Sit: Stand-by assistance  Bed to Chair: Stand-by assistance    Balance  Sitting: Intact  Standing: Pull to stand; Without support              Weight Bearing Status  Right Side Weight Bearing: As tolerated  Distance (ft): 300 Feet (ft)  Ambulation - Level of Assistance: Stand-by assistance  Assistive Device: Walker, rolling  Speed/Maria Del Carmen: Delayed  Step Length: Left shortened  Stance: Right decreased  Gait Abnormalities: Antalgic;Decreased step clearance  Interventions: Safety awareness training     Braces/Orthotics: none    Right Knee Cold  Type: Cryocuff      Body Structures Involved:  1. Joints  2. Muscles Body Functions Affected:  1. Movement Related Activities and Participation Affected:  1. Mobility   Number of elements that affect the Plan of Care: 4+: HIGH COMPLEXITY   CLINICAL PRESENTATION:   Presentation: Stable and uncomplicated: LOW COMPLEXITY   CLINICAL DECISION MAKIN [facility] [id number] AM-PAC 6 Clicks   Basic Mobility Inpatient Short Form  How much difficulty does the patient currently have. .. Unable A Lot A Little None   1. Turning over in bed (including adjusting bedclothes, sheets and blankets)? [] 1   [] 2   [x] 3   [] 4   2. Sitting down on and standing up from a chair with arms ( e.g., wheelchair, bedside commode, etc.)   [] 1   [] 2   [x] 3   [] 4   3. Moving from lying on back to sitting on the side of the bed? [] 1   [] 2   [x] 3   [] 4   How much help from another person does the patient currently need. .. Total A Lot A Little None   4. Moving to and from a bed to a chair (including a wheelchair)? [] 1   [] 2   [x] 3   [] 4   5. Need to walk in hospital room? [] 1   [] 2   [x] 3   [] 4   6. Climbing 3-5 steps with a railing? [] 1   [] 2   [x] 3   [] 4   © 2007, Trustees of [facility] [address] [address], under license to [employer] ???.  All rights reserved     Score:  Initial: 18 Most Recent: X (Date: -- )    Interpretation of Tool:  Represents activities that are increasingly more difficult (i.e. Bed mobility, Transfers, Gait).    Medical Necessity:     · Patient is expected to demonstrate progress in   · strength, range of motion, and functional technique  ·  to   · increase independence with gait and transfers   · . Reason for Services/Other Comments:  · Patient continues to require skilled intervention due to   · Limited functional independence   · . Use of outcome tool(s) and clinical judgement create a POC that gives a: Clear prediction of patient's progress: LOW COMPLEXITY            TREATMENT:   (In addition to Assessment/Re-Assessment sessions the following treatments were rendered)     Pre-treatment Symptoms/Complaints:  some knee pain  Pain Initial:   Pain Intensity 1: 4  Post Session:  4   Gait Training (15 Minutes):  Gait training to improve and/or restore physical functioning as related to mobility and strength. Ambulated 300 Feet (ft) with Stand-by assistance using a Walker, rolling and minimal Safety awareness training related to their stance phase to promote proper body alignment. Therapeutic Exercise: (10 Minutes):  Exercises per grid below to improve mobility and strength. Required minimal verbal cues to promote proper body alignment. Date:  1/9/20 Date:  1/10 Date:     ACTIVITY/EXERCISE AM PM AM PM AM PM   GROUP THERAPY  []  []  []  []  []  []   Ankle Pumps  10 15      Quad Sets  10 15      Gluteal Sets  10 15      Hip ABd/ADduction  10 15      Straight Leg Raises  10 15      Knee Slides  10 15      Short Arc Quads         Long Arc Quads         Chair Slides                  B = bilateral; AA = active assistive; A = active; P = passive      Treatment/Session Assessment:     Response to Treatment:  tolerated therapy.      Education:  [x] Home Exercises  [x] Fall Precautions  []  [] D/C Instruction Review  [] Knee Prosthesis Review  [x] Walker Management/Safety [] Adaptive Equipment as Needed       Interdisciplinary Collaboration:   o Physical Therapist  o Registered Nurse    After treatment position/precautions:   o Up in chair  o Bed/Chair-wheels locked  o Bed in low position  o Call light within reach  o RN notified    Compliance with Program/Exercises: Compliant all of the time. Recommendations/Intent for next treatment session:  Treatment next visit will focus on increasing Ms. Laws's independence with bed mobility, transfers, gait training, strength/ROM exercises, modalities for pain, and patient education.       Total Treatment Duration:  PT Patient Time In/Time Out  Time In: 0900  Time Out: CRIS Martinez

## 2020-01-10 NOTE — PROGRESS NOTES
Problem: Self Care Deficits Care Plan (Adult)  Goal: *Acute Goals and Plan of Care (Insert Text)  Description  GOALS:   DISCHARGE GOALS (in preparation for going home/rehab):  3 days  1. Ms. Grecia Arredondo will perform one lower body dressing activity with minimal assistance required to demonstrate improved functional mobility and safety. 2.  Ms. Grecia Arredondo will perform one lower body bathing activity with minimal assistance required to demonstrate improved functional mobility and safety. 3.  Ms. Grecia Arredondo will perform toileting/toilet transfer with contact guard assistance to demonstrate improved functional mobility and safety. 4.  Ms. Grecia Arredondo will perform shower transfer with contact guard assistance to demonstrate improved functional mobility and safety. Outcome: MET       JOINT CAMP OCCUPATIONAL THERAPY TKA: Daily Note, Discharge, Treatment Day: 1st and AM 1/10/2020  INPATIENT: Hospital Day: 2  Payor: Austyn Iqbal / Plan: [address] [place] [address] HMO / Product Type: [employer] /      NAME/AGE/GENDER: Alexa Bronson is a 69 y.o. female   PRIMARY DIAGNOSIS:  Unilateral primary osteoarthritis, right knee [M17.11]   Procedure(s) and Anesthesia Type:     * RIGHT NORM KNEE ARTHROPLASTY TOTAL - Spinal (Right)  ICD-10: Treatment Diagnosis:    · Pain in Right Knee (M25.561)  · Stiffness of Right Knee, Not elsewhere classified (K34.389)      ASSESSMENT:      Ms. Grecia Arredondo is s/p right TKA and presents with decreased weight bearing on right LE and decreased independence with functional mobility and activities of daily living. Patient completed shower and dressing as charter below in ADL grid and is ambulating with rolling walker and supervision assist.  Patient has met 4/4 goals and plans to return home with good family support. Family able to provide patient with appropriate level of assistance at this time. OT reviewed safety precautions throughout session and therapy schedule for the remainder of today.   Patient instructed to call for assistance when needing to get up from recliner and all needs in reach. Patient verbalized understanding of call light. This section established at most recent assessment   PROBLEM LIST (Impairments causing functional limitations):  1. Decreased Strength  2. Decreased ADL/Functional Activities  3. Decreased Transfer Abilities  4. Increased Pain  5. Increased Fatigue  6. Decreased Flexibility/Joint Mobility  7. Decreased Knowledge of Precautions   INTERVENTIONS PLANNED: (Benefits and precautions of occupational therapy have been discussed with the patient.)  1. Activities of daily living training  2. Adaptive equipment training  3. Balance training  4. Clothing management  5. Donning&doffing training  6. Theraputic activity     TREATMENT PLAN: Frequency/Duration: Follow patient 1-2 times to address above goals. Rehabilitation Potential For Stated Goals: Good     RECOMMENDED REHABILITATION/EQUIPMENT: (at time of discharge pending progress): Continue Skilled Therapy. OCCUPATIONAL PROFILE AND HISTORY:   History of Present Injury/Illness (Reason for Referral): Pt presents this date s/p (right) TKA. Past Medical History/Comorbidities:   Ms. Grecia Arredondo  has a past medical history of Adopted, Allergic rhinitis, Dyslipidemia, Essential hypertension, GERD (gastroesophageal reflux disease), Nausea & vomiting, PAF (paroxysmal atrial fibrillation) ([address] SSS (sick sinus syndrome) ([address] Type 2 diabetes mellitus ([address] and VHD (valvular heart disease).   Ms. Grecia Arredondo  has a past surgical history that includes hx knee replacement (Left, 2014); hx tubal ligation; hx pacemaker placement (10/26/2018); hx dilation and curettage; hx colonoscopy; hx heart catheterization; and hx other surgical.  Social History/Living Environment:      Prior Level of Function/Work/Activity:  Independent      Number of Personal Factors/Comorbidities that affect the Plan of Care: Brief history (0):  LOW COMPLEXITY ASSESSMENT OF OCCUPATIONAL PERFORMANCE[de-identified]   Most Recent Physical Functioning:   Balance  Sitting: Intact  Standing: Intact; With support                              Mental Status  Neurologic State: Alert                Basic ADLs (From Assessment) Complex ADLs (From Assessment)   Basic ADL  Feeding: Independent  Oral Facial Hygiene/Grooming: Supervision  Bathing: Moderate assistance  Type of Bath: Chlorhexidine (CHG), Full, Shower  Upper Body Dressing: Supervision  Lower Body Dressing: Moderate assistance  Toileting: Minimum assistance     Grooming/Bathing/Dressing Activities of Daily Living   Grooming  Grooming Assistance: Supervision(standing at sink)     Upper Body Bathing  Bathing Assistance: Supervision     Lower Body Bathing  Bathing Assistance: Supervision(verbal cues for tech) Toileting  Toileting Assistance: Supervision(elevataed seat walker)   Upper Body Dressing Assistance  Dressing Assistance: Supervision Functional Transfers  Bathroom Mobility: Supervision/set up  Toilet Transfer : Supervision  Shower Transfer: Supervision   [address]: Minimum assistance Bed/Mat Mobility  Sit to Stand: Stand-by assistance  Stand to Sit: Stand-by assistance  Bed to Chair: Stand-by assistance         Physical Skills Involved:  1. Range of Motion  2. Balance  3. Strength Cognitive Skills Affected (resulting in the inability to perform in a timely and safe manner): 1. none  Psychosocial Skills Affected:  1. Environmental Adaptation   Number of elements that affect the Plan of Care: 3-5:  MODERATE COMPLEXITY   CLINICAL DECISION MAKIN [facility] [address] [address] AM-PAC 6 Clicks   Daily Activity Inpatient Short Form  How much help from another person does the patient currently need. .. Total A Lot A Little None   1. Putting on and taking off regular lower body clothing? [] 1   [] 2   [x] 3   [] 4   2. Bathing (including washing, rinsing, drying)? [] 1   [] 2   [] 3   [x] 4   3. Toileting, which includes using toilet, bedpan or urinal?   [] 1   [] 2   [] 3   [x] 4   4. Putting on and taking off regular upper body clothing? [] 1   [] 2   [] 3   [x] 4   5. Taking care of personal grooming such as brushing teeth? [] 1   [] 2   [] 3   [x] 4   6. Eating meals? [] 1   [] 2   [] 3   [x] 4   © 2007, Trustees of [address] [place] [address] [address] [address], under license to [employer] All rights reserved     Score:  Initial: 19 Most Recent: 23 (Date:1/10/2020 )    Interpretation of Tool:  Represents activities that are increasingly more difficult (i.e. Bed mobility, Transfers, Gait). Use of outcome tool(s) and clinical judgement create a POC that gives a: LOW COMPLEXITY            TREATMENT:   (In addition to Assessment/Re-Assessment sessions the following treatments were rendered)     Pre-treatment Symptoms/Complaints:  no complaint of pain during therapy  Pain: Initial:     0 Post Session:  0     Self Care: (15 min): Procedure(s) (per grid) utilized to improve and/or restore self-care/home management as related to dressing and toileting. Required minimal verbal and   cueing to facilitate activities of daily living skills and compensatory activities. OT evaluation completed   Treatment/Session Assessment:     Response to Treatment:  pt up to shower tolerated well. Education:  [] Home Exercises  [x] Fall Precautions  [] Hip Precautions [] Going Home Video  [x] Knee/Hip Prosthesis Review  [x] Walker Management/Safety [x] Adaptive Equipment as Needed       Interdisciplinary Collaboration:   o Physical Therapist  o Occupational Therapist  o Registered Nurse    After treatment position/precautions:   o Up in chair  o Bed/Chair-wheels locked  o Call light within reach  o RN notified     Compliance with Program/Exercises: Compliant all of the time. Recommendations/Intent for next treatment session:  Pt doing well all goals met and will do well at home with support from .  Patient will be discharged home with home health PT. No further Occupational Therapy warranted, will discharge Occupational Therapy services.         Total Treatment Duration:  OT Patient Time In/Time Out  Time In: 1020  Time Out: Chon  Linnette Healy

## 2020-01-10 NOTE — DISCHARGE INSTRUCTIONS
[facility]   Patient Discharge Instructions    Nimo Fan / [id number] : 1945    Admitted 2020 Discharged: 1/10/2020     IF YOU HAVE ANY PROBLEMS ONCE YOU ARE AT HOME CALL THE FOLLOWING NUMBERS:   Main office number: [phone]    Take Home Medications     · It is important that you take the medication exactly as they are prescribed. · Keep your medication in the bottles provided by the pharmacist and keep a list of the medication names, dosages, and times to be taken in your wallet. · Do not take other medications without consulting your doctor. What to do at [address] your prehospital diet. If you have excessive nausea or vomitting call your doctor's office     Home Physical Therapy is arranged. Use rolling walker when walking. Patients who have had a joint replacement should not drive until you are seen for your follow up appointment by Dr. Mavis Sue. When to Call    - Call if you have a temperature greater then 101  - Unable to keep food down  - Loose control of your bladder or bowel function  - Are unable to bear any weight   - Need a pain medication refill       DISCHARGE SUMMARY from Nurse    The following personal items collected during your admission are returned to you:   Dental Appliance: Dental Appliances: With patient  Vision: Visual Aid: Glasses  Hearing Aid:    Jewelry: Jewelry: None  Clothing: Clothing: At bedside  Other Valuables: Other Valuables: Cell Phone(husabnd to keep)  Valuables sent to safe:      PATIENT INSTRUCTIONS:    After general anesthesia or intravenous sedation, for 24 hours or while taking prescription Narcotics:  · Limit your activities  · Do not drive and operate hazardous machinery  · Do not make important personal or business decisions  · Do  not drink alcoholic beverages  · If you have not urinated within 8 hours after discharge, please contact your surgeon on call.     Report the following to your surgeon:  · Excessive pain, swelling, redness or odor of or around the surgical area  · Temperature over 101  · Nausea and vomiting lasting longer than 4 hours or if unable to take medications  · Any signs of decreased circulation or nerve impairment to extremity: change in color, persistent  numbness, tingling, coldness or increase pain  · Any questions, call office @ [phone]      Keep scheduled follow up appointment. If need to change, call office @ [phone]. *  Please give a list of your current medications to your Primary Care Provider. *  Please update this list whenever your medications are discontinued, doses are      changed, or new medications (including over-the-counter products) are added. *  Please carry medication information at all times in case of emergency situations. Patient Education        Total Knee Replacement: What to Expect at Home  Your Recovery    When you leave the hospital, you should be able to move around with a walker or crutches. But you will need someone to help you at home for the next few weeks or until you have more energy and can move around better. If you need more extensive rehab, you may go to a specialized rehab center for more treatment. You will go home with a bandage and stitches, staples, tissue glue, or tape strips. Change the bandage as your doctor tells you to. If you have stitches or staples, your doctor will remove them 10 to 21 days after your surgery. Glue or tape strips will fall off on their own over time. You may still have some mild pain, and the area may be swollen for 3 to 6 months after surgery. Your knee will continue to improve for 6 to 12 months. You will probably use a walker for 1 to 3 weeks and then use crutches. When you are ready, you can use a cane. You will probably be able to walk on your own in 4 to 8 weeks. You will need to do months of physical rehabilitation (rehab) after a knee replacement.  Rehab will help you strengthen the muscles of the knee and help you regain movement. After you recover, your artificial knee will allow you to do normal daily activities with less pain or no pain at all. You may be able to hike, dance, ride a bike, and play golf. Talk to your doctor about whether you can do more strenuous activities. Always tell your caregivers that you have an artificial knee. How long it will take to walk on your own, return to normal activities, and go back to work depends on your health and how well your rehabilitation (rehab) program goes. The better you do with your rehab exercises, the quicker you will get your strength and movement back. This care sheet gives you a general idea about how long it will take for you to recover. But each person recovers at a different pace. Follow the steps below to get better as quickly as possible. How can you care for yourself at home? Activity    · Rest when you feel tired. You may take a nap, but do not stay in bed all day. When you sit, use a chair with arms. You can use the arms to help you stand up.     · Work with your physical therapist to find the best way to exercise. What you can do as your knee heals will depend on whether your new knee is cemented or uncemented. You may not be able to do certain things for a while if your new knee is uncemented.     · After your knee has healed enough, you can do more strenuous activities with caution. ? You can golf, but use a golf cart, and do not wear shoes with spikes. ? You can bike on a flat road or on a stationary bike. Avoid biking up hills. ? Your doctor may suggest that you stay away from activities that put stress on your knee. These include tennis or badminton, squash or racquetball, contact sports like football, jumping (such as in basketball), jogging, or running. ? Avoid activities where you might fall. These include horseback riding, skiing, and mountain biking.     · Do not sit for more than 1 hour at a time.  Get up and walk around for a while before you sit again. If you must sit for a long time, prop up your leg with a chair or footstool. This will help you avoid swelling.     · Ask your doctor when you can drive again. It may take up to 8 weeks after knee replacement surgery before it is safe for you to drive.     · When you get into a car, sit on the edge of the seat. Then pull in your legs, and turn to face the front.     · You should be able to do many everyday activities 3 to 6 weeks after your surgery. You will probably need to take 4 to 16 weeks off from work. When you can go back to work depends on the type of work you do and how you feel.     · Ask your doctor when it is okay for you to have sex.     · Do not lift anything heavier than 10 pounds and do not lift weights for 12 weeks. Diet    · By the time you leave the hospital, you should be eating your normal diet. If your stomach is upset, try bland, low-fat foods like plain rice, broiled chicken, toast, and yogurt. Your doctor may suggest that you take iron and vitamin supplements.     · Drink plenty of fluids (unless your doctor tells you not to).   · Eat healthy foods, and watch your portion sizes. Try to stay at your ideal weight. Too much weight puts more stress on your new knee.     · You may notice that your bowel movements are not regular right after your surgery. This is common. Try to avoid constipation and straining with bowel movements. You may want to take a fiber supplement every day. If you have not had a bowel movement after a couple of days, ask your doctor about taking a mild laxative. Medicines    · Your doctor will tell you if and when you can restart your medicines. He or she will also give you instructions about taking any new medicines.     · If you take blood thinners, such as warfarin (Coumadin), clopidogrel (Plavix), or aspirin, be sure to talk to your doctor. He or she will tell you if and when to start taking those medicines again.  Make sure that you understand exactly what your doctor wants you to do.     · Your doctor may give you a blood-thinning medicine to prevent blood clots. If you take a blood thinner, be sure you get instructions about how to take your medicine safely. Blood thinners can cause serious bleeding problems. This medicine could be in pill form or as a shot (injection). If a shot is necessary, your doctor will tell you how to do this.     · Be safe with medicines. Take pain medicines exactly as directed. ? If the doctor gave you a prescription medicine for pain, take it as prescribed. ? If you are not taking a prescription pain medicine, ask your doctor if you can take an over-the-counter medicine. ? Plan to take your pain medicine 30 minutes before exercises. It is easier to prevent pain before it starts than to stop it once it has started.     · If you think your pain medicine is making you sick to your stomach:  ? Take your medicine after meals (unless your doctor has told you not to). ? Ask your doctor for a different pain medicine.     · If your doctor prescribed antibiotics, take them as directed. Do not stop taking them just because you feel better. You need to take the full course of antibiotics. Incision care    · If your doctor told you how to care for your cut (incision), follow your doctor's instructions. You will have a dressing over the cut. A dressing helps the incision heal and protects it. Your doctor will tell you how to take care of this.     · If you did not get instructions, follow this general advice:  ? If you have strips of tape on the cut the doctor made, leave the tape on for a week or until it falls off.  ? If you have stitches or staples, your doctor will tell you when to come back to have them removed. ? If you have skin adhesive on the cut, leave it on until it falls off. Skin adhesive is also called glue or liquid stitches. ? Change the bandage every day. ?  Wash the area daily with warm water, and pat it dry. Don't use hydrogen peroxide or alcohol. They can slow healing. ? You may cover the area with a gauze bandage if it oozes fluid or rubs against clothing. ? You may shower 24 to 48 hours after surgery. Pat the incision dry. Don't swim or take a bath for the first 2 weeks, or until your doctor tells you it is okay. Exercise    · Your rehab program will give you a number of exercises to do to help you get back your knee's range of motion and strength. Always do them as your therapist tells you. Ice and elevation    · For pain and swelling, put ice or a cold pack on the area for 10 to 20 minutes at a time. Put a thin cloth between the ice and your skin. Other instructions    · Continue to wear your support stockings as your doctor says. These help to prevent blood clots. The length of time that you will have to wear them depends on your activity level and the amount of swelling.     · You have metal pieces in your knee. These may set off some airport metal detectors. Carry a medical alert card that says you have an artificial joint, just in case. Follow-up care is a key part of your treatment and safety. Be sure to make and go to all appointments, and call your doctor if you are having problems. It's also a good idea to know your test results and keep a list of the medicines you take. When should you call for help? Call 911 anytime you think you may need emergency care. For example, call if:    · You passed out (lost consciousness).     · You have severe trouble breathing.     · You have sudden chest pain and shortness of breath, or you cough up blood.    Call your doctor now or seek immediate medical care if:    · You have signs of infection, such as:  ? Increased pain, swelling, warmth, or redness. ? Red streaks leading from the incision. ? Pus draining from the incision. ? A fever.     · You have signs of a blood clot, such as:  ? Pain in your calf, back of the knee, thigh, or groin. ?  Redness and swelling in your leg or groin.     · Your incision comes open and begins to bleed, or the bleeding increases.     · You have pain that does not get better after you take pain medicine.    Watch closely for changes in your health, and be sure to contact your doctor if:    · You do not have a bowel movement after taking a laxative. Where can you learn more? Go to http://faith-akbar.info/. Enter S904 in the search box to learn more about \"Total Knee Replacement: What to Expect at Home. \"  Current as of: June 26, 2019  Content Version: 12.2  © 5399-7065 [employer]. Care instructions adapted under license by [employer] (which disclaims liability or warranty for this information). If you have questions about a medical condition or this instruction, always ask your healthcare professional. [address] any warranty or liability for your use of this information. These are general instructions for a healthy lifestyle:    No smoking/ No tobacco products/ Avoid exposure to second hand smoke    Surgeon General's Warning:  Quitting smoking now greatly reduces serious risk to your health. Obesity, smoking, and sedentary lifestyle greatly increases your risk for illness    A healthy diet, regular physical exercise & weight monitoring are important for maintaining a healthy lifestyle    You may be retaining fluid if you have a history of heart failure or if you experience any of the following symptoms:  Weight gain of 3 pounds or more overnight or 5 pounds in a week, increased swelling in our hands or feet or shortness of breath while lying flat in bed. Please call your doctor as soon as you notice any of these symptoms; do not wait until your next office visit.     Recognize signs and symptoms of STROKE:    F-face looks uneven    A-arms unable to move or move even    S-speech slurred or non-existent    T-time-call 911 as soon as signs and symptoms begin-DO NOT go       Back to bed or wait to see if you get better-TIME IS BRAIN. The discharge information has been reviewed with the patient. The patient verbalized understanding. Information obtained by :  I understand that if any problems occur once I am at home I am to contact my physician. I understand and acknowledge receipt of the instructions indicated above.                                                                                                                                            Physician's or R.N.'s Signature                                                                  Date/Time                                                                                                                                              Patient or Representative Signature                                                          Date/Time

## 2020-01-10 NOTE — PROGRESS NOTES
I have reviewed discharge instructions with the patient and spouse. Instructed on last pain medication given and when patient is allowed for next dose, ONLY TO BE TAKEN IF NEEDED. The patient and spouse verbalized understanding. Signed discharge paper in paper chart. Pain medication prescription given to patient. Will discharge home with family.

## 2020-01-10 NOTE — PROGRESS NOTES
January 10, 2020         Post Op day: 1 Day Post-Op   Admit Diagnosis: Unilateral primary osteoarthritis, right knee [M17.11]  Arthritis of right knee [M17.11]  LAB:    Recent Results (from the past 24 hour(s))   HEMOGLOBIN    Collection Time: 20  6:54 PM   Result Value Ref Range    HGB 8.9 (L) 11.7 - 15.4 g/dL   HEMOGLOBIN    Collection Time: 01/10/20  3:48 AM   Result Value Ref Range    HGB 8.2 (L) 11.7 - 15.4 g/dL   HEMOGLOBIN A1C WITH EAG    Collection Time: 01/10/20  3:48 AM   Result Value Ref Range    Hemoglobin A1c 7.3 %    Est. average glucose 163 mg/dL     Vital Signs:    Patient Vitals for the past 8 hrs:   BP Temp Pulse Resp SpO2   01/10/20 0809 -- -- -- -- 98 %   01/10/20 0649 125/60 97.7 °F (36.5 °C) 70 16 97 %   01/10/20 0454 117/60 97.7 °F (36.5 °C) 69 18 97 %     Temp (24hrs), Av.9 °F (36.6 °C), Min:97.6 °F (36.4 °C), Max:99 °F (37.2 °C)    Pain Control:   Pain Assessment  Pain Scale 1: Numeric (0 - 10)  Pain Intensity 1: 4  Pain Onset 1: long time  Pain Location 1: Knee  Pain Orientation 1: Right  Pain Description 1: Aching  Pain Intervention(s) 1: Medication (see MAR)  Subjective: Doing well, normal recovery experienced. Objective:  No Acute Distress, Alert and Oriented, Neurovascular exam is normal       Assessment:   Patient Active Problem List   Diagnosis Code    Arthritis of right knee M17.11       Status Post Procedure(s) (LRB):  RIGHT NORM KNEE ARTHROPLASTY TOTAL (Right)        Plan: Continue Physical Therapy, discharge home anticipated. Stable if no anemia symptoms to discharge today.    Signed By: Paul Durham MD

## 2020-01-11 ENCOUNTER — HOME CARE VISIT (OUTPATIENT)
Dept: SCHEDULING | Facility: HOME HEALTH | Age: 75
End: 2020-01-11
Payer: MEDICARE

## 2020-01-11 VITALS
HEART RATE: 72 BPM | DIASTOLIC BLOOD PRESSURE: 78 MMHG | RESPIRATION RATE: 18 BRPM | TEMPERATURE: 97.6 F | SYSTOLIC BLOOD PRESSURE: 132 MMHG

## 2020-01-11 PROCEDURE — 3331090002 HH PPS REVENUE DEBIT

## 2020-01-11 PROCEDURE — G0151 HHCP-SERV OF PT,EA 15 MIN: HCPCS

## 2020-01-11 PROCEDURE — 3331090001 HH PPS REVENUE CREDIT

## 2020-01-11 PROCEDURE — 400013 HH SOC

## 2020-01-12 PROCEDURE — 3331090001 HH PPS REVENUE CREDIT

## 2020-01-12 PROCEDURE — 3331090002 HH PPS REVENUE DEBIT

## 2020-01-13 ENCOUNTER — HOME CARE VISIT (OUTPATIENT)
Dept: SCHEDULING | Facility: HOME HEALTH | Age: 75
End: 2020-01-13
Payer: MEDICARE

## 2020-01-13 VITALS
RESPIRATION RATE: 16 BRPM | HEART RATE: 81 BPM | OXYGEN SATURATION: 96 % | SYSTOLIC BLOOD PRESSURE: 148 MMHG | DIASTOLIC BLOOD PRESSURE: 80 MMHG | TEMPERATURE: 97.3 F

## 2020-01-13 PROCEDURE — 3331090002 HH PPS REVENUE DEBIT

## 2020-01-13 PROCEDURE — G0157 HHC PT ASSISTANT EA 15: HCPCS

## 2020-01-13 PROCEDURE — 3331090001 HH PPS REVENUE CREDIT

## 2020-01-14 PROCEDURE — 3331090002 HH PPS REVENUE DEBIT

## 2020-01-14 PROCEDURE — 3331090001 HH PPS REVENUE CREDIT

## 2020-01-15 ENCOUNTER — HOME CARE VISIT (OUTPATIENT)
Dept: SCHEDULING | Facility: HOME HEALTH | Age: 75
End: 2020-01-15
Payer: MEDICARE

## 2020-01-15 VITALS
OXYGEN SATURATION: 98 % | TEMPERATURE: 97 F | SYSTOLIC BLOOD PRESSURE: 138 MMHG | HEART RATE: 73 BPM | DIASTOLIC BLOOD PRESSURE: 76 MMHG | RESPIRATION RATE: 17 BRPM

## 2020-01-15 PROCEDURE — G0157 HHC PT ASSISTANT EA 15: HCPCS

## 2020-01-15 PROCEDURE — 3331090001 HH PPS REVENUE CREDIT

## 2020-01-15 PROCEDURE — 3331090002 HH PPS REVENUE DEBIT

## 2020-01-16 PROCEDURE — 3331090002 HH PPS REVENUE DEBIT

## 2020-01-16 PROCEDURE — 3331090001 HH PPS REVENUE CREDIT

## 2020-01-17 ENCOUNTER — HOME CARE VISIT (OUTPATIENT)
Dept: SCHEDULING | Facility: HOME HEALTH | Age: 75
End: 2020-01-17
Payer: MEDICARE

## 2020-01-17 VITALS
OXYGEN SATURATION: 99 % | HEART RATE: 78 BPM | DIASTOLIC BLOOD PRESSURE: 82 MMHG | SYSTOLIC BLOOD PRESSURE: 140 MMHG | RESPIRATION RATE: 16 BRPM | TEMPERATURE: 97 F

## 2020-01-17 PROCEDURE — 3331090002 HH PPS REVENUE DEBIT

## 2020-01-17 PROCEDURE — G0157 HHC PT ASSISTANT EA 15: HCPCS

## 2020-01-17 PROCEDURE — 3331090001 HH PPS REVENUE CREDIT

## 2020-01-18 PROCEDURE — 3331090002 HH PPS REVENUE DEBIT

## 2020-01-18 PROCEDURE — 3331090001 HH PPS REVENUE CREDIT

## 2020-01-19 PROCEDURE — 3331090001 HH PPS REVENUE CREDIT

## 2020-01-19 PROCEDURE — 3331090002 HH PPS REVENUE DEBIT

## 2020-01-20 ENCOUNTER — HOME CARE VISIT (OUTPATIENT)
Dept: SCHEDULING | Facility: HOME HEALTH | Age: 75
End: 2020-01-20
Payer: MEDICARE

## 2020-01-20 VITALS
SYSTOLIC BLOOD PRESSURE: 126 MMHG | DIASTOLIC BLOOD PRESSURE: 80 MMHG | TEMPERATURE: 98 F | RESPIRATION RATE: 16 BRPM | HEART RATE: 90 BPM | OXYGEN SATURATION: 100 %

## 2020-01-20 PROCEDURE — 3331090002 HH PPS REVENUE DEBIT

## 2020-01-20 PROCEDURE — G0157 HHC PT ASSISTANT EA 15: HCPCS

## 2020-01-20 PROCEDURE — 3331090001 HH PPS REVENUE CREDIT

## 2020-01-21 PROCEDURE — 3331090001 HH PPS REVENUE CREDIT

## 2020-01-21 PROCEDURE — 3331090002 HH PPS REVENUE DEBIT

## 2020-01-22 ENCOUNTER — HOME CARE VISIT (OUTPATIENT)
Dept: SCHEDULING | Facility: HOME HEALTH | Age: 75
End: 2020-01-22
Payer: MEDICARE

## 2020-01-22 VITALS
DIASTOLIC BLOOD PRESSURE: 80 MMHG | RESPIRATION RATE: 16 BRPM | OXYGEN SATURATION: 98 % | HEART RATE: 84 BPM | TEMPERATURE: 97 F | SYSTOLIC BLOOD PRESSURE: 138 MMHG

## 2020-01-22 PROCEDURE — 3331090002 HH PPS REVENUE DEBIT

## 2020-01-22 PROCEDURE — 3331090001 HH PPS REVENUE CREDIT

## 2020-01-22 PROCEDURE — G0157 HHC PT ASSISTANT EA 15: HCPCS

## 2020-01-23 PROCEDURE — 3331090001 HH PPS REVENUE CREDIT

## 2020-01-23 PROCEDURE — 3331090002 HH PPS REVENUE DEBIT

## 2020-01-24 PROCEDURE — A6258 TRANSPARENT FILM >16<=48 IN: HCPCS

## 2020-01-24 PROCEDURE — 3331090001 HH PPS REVENUE CREDIT

## 2020-01-24 PROCEDURE — A4649 SURGICAL SUPPLIES: HCPCS

## 2020-01-24 PROCEDURE — 3331090002 HH PPS REVENUE DEBIT

## 2020-01-25 PROCEDURE — 3331090001 HH PPS REVENUE CREDIT

## 2020-01-25 PROCEDURE — 3331090002 HH PPS REVENUE DEBIT

## 2020-01-26 PROCEDURE — 3331090002 HH PPS REVENUE DEBIT

## 2020-01-26 PROCEDURE — 3331090001 HH PPS REVENUE CREDIT

## 2020-01-27 ENCOUNTER — HOME CARE VISIT (OUTPATIENT)
Dept: SCHEDULING | Facility: HOME HEALTH | Age: 75
End: 2020-01-27
Payer: MEDICARE

## 2020-01-27 VITALS
TEMPERATURE: 97.8 F | DIASTOLIC BLOOD PRESSURE: 80 MMHG | RESPIRATION RATE: 16 BRPM | SYSTOLIC BLOOD PRESSURE: 138 MMHG | HEART RATE: 74 BPM

## 2020-01-27 PROCEDURE — 3331090001 HH PPS REVENUE CREDIT

## 2020-01-27 PROCEDURE — 3331090002 HH PPS REVENUE DEBIT

## 2020-01-27 PROCEDURE — G0157 HHC PT ASSISTANT EA 15: HCPCS

## 2020-01-28 PROCEDURE — 3331090001 HH PPS REVENUE CREDIT

## 2020-01-28 PROCEDURE — 3331090002 HH PPS REVENUE DEBIT

## 2020-01-29 ENCOUNTER — HOME CARE VISIT (OUTPATIENT)
Dept: SCHEDULING | Facility: HOME HEALTH | Age: 75
End: 2020-01-29
Payer: MEDICARE

## 2020-01-29 VITALS
RESPIRATION RATE: 16 BRPM | DIASTOLIC BLOOD PRESSURE: 70 MMHG | TEMPERATURE: 97.5 F | SYSTOLIC BLOOD PRESSURE: 110 MMHG | HEART RATE: 78 BPM

## 2020-01-29 PROCEDURE — 3331090001 HH PPS REVENUE CREDIT

## 2020-01-29 PROCEDURE — G0151 HHCP-SERV OF PT,EA 15 MIN: HCPCS

## 2020-01-29 PROCEDURE — 3331090002 HH PPS REVENUE DEBIT

## 2020-03-01 PROBLEM — Z96.651 PRESENCE OF RIGHT ARTIFICIAL KNEE JOINT: Status: ACTIVE | Noted: 2020-03-01

## 2020-03-01 PROBLEM — Z96.652 PRESENCE OF LEFT ARTIFICIAL KNEE JOINT: Status: ACTIVE | Noted: 2020-03-01

## 2020-03-01 PROBLEM — M17.11 UNILATERAL PRIMARY OSTEOARTHRITIS, RIGHT KNEE: Status: ACTIVE | Noted: 2020-03-01

## 2022-03-18 PROBLEM — Z96.651 PRESENCE OF RIGHT ARTIFICIAL KNEE JOINT: Status: ACTIVE | Noted: 2020-03-01

## 2022-03-19 PROBLEM — M17.11 ARTHRITIS OF RIGHT KNEE: Status: ACTIVE | Noted: 2020-01-09

## 2022-03-20 PROBLEM — M17.11 UNILATERAL PRIMARY OSTEOARTHRITIS, RIGHT KNEE: Status: ACTIVE | Noted: 2020-03-01

## 2022-03-20 PROBLEM — Z96.652 PRESENCE OF LEFT ARTIFICIAL KNEE JOINT: Status: ACTIVE | Noted: 2020-03-01

## 2022-12-09 ENCOUNTER — OFFICE VISIT (OUTPATIENT)
Dept: ORTHOPEDIC SURGERY | Age: 77
End: 2022-12-09

## 2022-12-09 DIAGNOSIS — Z96.652 STATUS POST TOTAL KNEE REPLACEMENT, LEFT: ICD-10-CM

## 2022-12-09 DIAGNOSIS — M25.562 ACUTE PAIN OF LEFT KNEE: Primary | ICD-10-CM

## 2022-12-09 DIAGNOSIS — Z96.651 STATUS POST RIGHT KNEE REPLACEMENT: ICD-10-CM

## 2022-12-09 NOTE — PROGRESS NOTES
Name: Yaa Martin  YOB: 1945  Gender: female  MRN: [id number]    CC:   Chief Complaint   Patient presents with    Knee Pain     Recheck Bilateral knees, states she fell in February         DIAGNOSIS:   Encounter Diagnoses   Name Primary? Acute pain of left knee Yes    Status post right knee replacement     Status post total knee replacement, left         HPI:  Patient fell on her knees in September complains of pain right knee greater than left  It hurts at night when sleeping. The pain is located over the knee. It does hurt to walk and gets worse with increased distances. The pain does not radiate down the leg. Numbness and tingling are not noted. Treatment so far has been her knee arthroplasty  H/o right TKA in 2020 and left TKA in around that time. Current Outpatient Medications:     CALCIUM PO, Take by mouth, Disp: , Rfl:     aspirin 81 MG EC tablet, Take 81 mg by mouth every 12 hours, Disp: , Rfl:     cetirizine (ZYRTEC) 10 MG tablet, Take 10 mg by mouth daily, Disp: , Rfl:     esomeprazole (NEXIUM) 20 MG delayed release capsule, Take 20 mg by mouth daily as needed, Disp: , Rfl:     gabapentin (NEURONTIN) 300 MG capsule, Take 300 mg by mouth 2 times daily. , Disp: , Rfl:     glipiZIDE (GLUCOTROL XL) 2.5 MG extended release tablet, Take 2.5 mg by mouth, Disp: , Rfl:     LORazepam (ATIVAN) 1 MG tablet, Take 1 mg by mouth., Disp: , Rfl:     metFORMIN (GLUCOPHAGE) 500 MG tablet, Take 500 mg by mouth, Disp: , Rfl:     methocarbamol (ROBAXIN) 750 MG tablet, Take 750 mg by mouth 4 times daily as needed, Disp: , Rfl:     ondansetron (ZOFRAN-ODT) 8 MG TBDP disintegrating tablet, Take 8 mg by mouth every 8 hours as needed, Disp: , Rfl:     simvastatin (ZOCOR) 20 MG tablet, Take 20 mg by mouth, Disp: , Rfl:   Allergies   Allergen Reactions    Clonidine Itching    Codeine Itching     And all derivitives    Hydrocodone Itching    Oxycodone Itching    Pregabalin Other (See Comments)     \"makes me feel funny\"     Tramadol Itching    Diclofenac Nausea And Vomiting and Other (See Comments)     Past Medical History:   Diagnosis Date    Adopted     Allergic rhinitis     Dyslipidemia     Essential hypertension     GERD (gastroesophageal reflux disease)     PRN medication     High cholesterol     Nausea & vomiting     PAF (paroxysmal atrial fibrillation) ([facility] [address]     per cardiology note (6/14/19) \"There has been a history in the past however several EKGs and Holter monitors were reviewed with no recurrence. She has remained on aspirin. \"     SSS (sick sinus syndrome) (HCC)     s/p PPM--Followed by Dr. Caridad Goodwin     Type 2 diabetes mellitus ([facility] [address]     controlled with oral medication, average FBS . No problems with hypoglycemia. VHD (valvular heart disease)     Mild to moderate TR/mild MR echo 2014     . [facility]  Past Surgical History:   Procedure Laterality Date    CARDIAC CATHETERIZATION      x2--no intervention     COLONOSCOPY      DILATION AND CURETTAGE OF UTERUS      OTHER SURGICAL HISTORY      esophageal stretching     PACEMAKER      PACEMAKER PLACEMENT  10/26/2018    TOTAL KNEE ARTHROPLASTY Left 2014    TUBAL LIGATION       No family history on file.   Social History     Socioeconomic History    Marital status:      Spouse name: Not on file    Number of children: Not on file    Years of education: Not on file    Highest education level: Not on file   Occupational History    Not on file   Tobacco Use    Smoking status: Never    Smokeless tobacco: Never   Substance and Sexual Activity    Alcohol use: Never    Drug use: Never    Sexual activity: Not on file   Other Topics Concern    Not on file   Social History Narrative    Not on file     Social Determinants of Health     Financial Resource Strain: Not on file   Food Insecurity: Not on file   Transportation Needs: Not on file   Physical Activity: Not on file   Stress: Not on file   Social Connections: Not on file   Intimate Partner Violence: Not on file Housing Stability: Not on file       Review of Systems:  As per HPI. Pertinent positives and negatives are addressed with the patient, particularly those related to musculoskeletal concerns. Non-orthopaedic concerns were referred back to the primary care physician. PHYSICAL EXAMINATION:   The patient appears their stated age and they are in no distress. The lower extremities are as described below. Circulation is normal with palpable pedal pulses bilaterally and no edema. There is no lymph adenopathy in the popliteal or malleolar region. The skin is without stasis disease distally bilaterally. Hip ROM is smooth and painless. Knee range of motion is 0-120 degrees on the right and 0-120 degrees on the left. bilateral knee: There is 2mm of anterior/posterior translation and 2mm of medial/lateral instability bilaterally. There is 2 degrees of varus alignment in the bilateral knee. There is some pain to palpation over the medial joint line. Limb lengths are equal.  The gait is noted to be with a slight trendelenburg and antalgia. Straight leg test is negative. Quadriceps strength is good. Sensation is intact to light touch bilaterally. Their judgment and insight are normal.  They are oriented to time, place and person. Their memory is good and the mood and affect appropriate. X-RAY: Views of the bilateral knee are reviewed. 3 views standing reveal good bone prosthetic interface with no suggestion of progressive lucency. X-ray impression:  Stable bilateral  total knee arthroplasty. She has screws in place from a condylar fracture experienced intraoperatively on the left side. IMPRESSION:    Diagnosis Orders   1. Acute pain of left knee  XR Knee Bilateral Standard      2. Status post right knee replacement  XR Knee Bilateral Standard      3. Status post total knee replacement, left        . RECOMMENDATIONS:    Reviewed x-ray findings with the patient.   She was informed her radiographs of appear stable without any suggestion of compromise. She has no progressive lucencies noted. She was informed she did not compromise her knees with the fall. She does have some radicular pain on the right side. She sees Dr. Kam Coleman and has had injections for this. We will see him back for continued follow-up. I would not see her back for 2 years if she had additional concerns or limitations. Approximately 30 minutes was spent reviewing the medical record, imaging, performing history and physical examination, discussing the diagnosis and treatment plan with the patient, and completing documentation for this visit.

## (undated) DEVICE — SUTURE PDS II SZ 1 L96IN ABSRB VLT TP-1 L65MM 1/2 CIR Z880G

## (undated) DEVICE — SYR 10ML CTRL LR LCK NSAF LF --

## (undated) DEVICE — KIT DRP FOR RIO ROBOTIC ARM ASST SYS

## (undated) DEVICE — REM POLYHESIVE ADULT PATIENT RETURN ELECTRODE: Brand: VALLEYLAB

## (undated) DEVICE — KIT PROC KNE TRACKING PK/1 -- VIZADISC MAKO

## (undated) DEVICE — SOLUTION IRRIG 3000ML 0.9% SOD CHL FLX CONT 0797208] ICU MEDICAL INC]

## (undated) DEVICE — KIT INT FIX FEM TIB CKPT MAKOPLASTY

## (undated) DEVICE — BLADE SURG SAW STD S STL OSC W/ SERR EDGE DISP

## (undated) DEVICE — PIN BNE FIX 4X140MM STRL -- 1EA=2PK MAKO

## (undated) DEVICE — BIPOLAR SEALER 23-112-1 AQM 6.0: Brand: AQUAMANTYS ®

## (undated) DEVICE — CURETTE BNE CEM 10IN DISP --

## (undated) DEVICE — PAD,NON-ADHERENT,3X8,STERILE,LF,1/PK: Brand: MEDLINE

## (undated) DEVICE — HANDPIECE SET WITH COAXIAL HIGH FLOW TIP AND SUCTION TUBE: Brand: INTERPULSE

## (undated) DEVICE — CORD RETRCT SIL

## (undated) DEVICE — SOLUTION IV 1000ML 0.9% SOD CHL

## (undated) DEVICE — STERILE PRESSURE PROTECTOR PAD® FOR DE MAYO UNIVERSAL DISTRACTOR® (10/CASE): Brand: DE MAYO UNIVERSAL DISTRACTOR®

## (undated) DEVICE — NEEDLE HYPO 21GA L1.5IN GRN POLYPR HUB S STL REG BVL STR

## (undated) DEVICE — Z DISCONTINUED PER MEDLINE USE 2741944 DRESSING AQUACEL 12 IN SURG W9XL30CM SIL CVR WTRPRF VIR BACT BARR ANTIMIC

## (undated) DEVICE — Device

## (undated) DEVICE — SYR LR LCK 1ML GRAD NSAF 30ML --

## (undated) DEVICE — SUTURE VCRL SZ 1 L27IN ABSRB UD L36MM CP-1 1/2 CIR REV CUT J268H

## (undated) DEVICE — NEEDLE HYPO 18GA L1.5IN PNK S STL HUB POLYPR SHLD REG BVL

## (undated) DEVICE — PIN BNE 4X110MM STRL -- 2/PK MAKO

## (undated) DEVICE — SOLUTION IV 500ML 0.9% SOD CHL FLX CONT

## (undated) DEVICE — BUTTON SWITCH PENCIL BLADE ELECTRODE, HOLSTER: Brand: EDGE

## (undated) DEVICE — 3M™ STERI-DRAPE™ INSTRUMENT POUCH 1018: Brand: STERI-DRAPE™

## (undated) DEVICE — SUTURE MCRYL SZ 2-0 L27IN ABSRB UD CP-1 1 L36MM 1/2 CIR REV Y266H

## (undated) DEVICE — TRAY PREP DRY W/ PREM GLV 2 APPL 6 SPNG 2 UNDPD 1 OVERWRAP

## (undated) DEVICE — TOTAL KNEE DR KAVOLUS: Brand: MEDLINE INDUSTRIES, INC.

## (undated) DEVICE — STOCKINETTE IMPERV 12X48IN STE -- MEDICHOICE

## (undated) DEVICE — PREP CHLORAPREP 10.5 ML ORG --

## (undated) DEVICE — SYR 50ML LR LCK 1ML GRAD NSAF --

## (undated) DEVICE — SPONGE LAP 18X18IN STRL -- 5/PK

## (undated) DEVICE — UTILITY MARKER,BLACK WITH LABELS: Brand: DEVON